# Patient Record
Sex: FEMALE | Race: WHITE | NOT HISPANIC OR LATINO | URBAN - METROPOLITAN AREA
[De-identification: names, ages, dates, MRNs, and addresses within clinical notes are randomized per-mention and may not be internally consistent; named-entity substitution may affect disease eponyms.]

---

## 2021-12-10 ENCOUNTER — APPOINTMENT (RX ONLY)
Dept: URBAN - METROPOLITAN AREA CLINIC 146 | Facility: CLINIC | Age: 76
Setting detail: DERMATOLOGY
End: 2021-12-10

## 2021-12-10 DIAGNOSIS — D485 NEOPLASM OF UNCERTAIN BEHAVIOR OF SKIN: ICD-10-CM

## 2021-12-10 DIAGNOSIS — D69.2 OTHER NONTHROMBOCYTOPENIC PURPURA: ICD-10-CM

## 2021-12-10 PROBLEM — D48.5 NEOPLASM OF UNCERTAIN BEHAVIOR OF SKIN: Status: ACTIVE | Noted: 2021-12-10

## 2021-12-10 PROCEDURE — ? ADDITIONAL NOTES

## 2021-12-10 PROCEDURE — ? FULL BODY SKIN EXAM - DECLINED

## 2021-12-10 PROCEDURE — 11103 TANGNTL BX SKIN EA SEP/ADDL: CPT

## 2021-12-10 PROCEDURE — 11102 TANGNTL BX SKIN SINGLE LES: CPT

## 2021-12-10 PROCEDURE — 99202 OFFICE O/P NEW SF 15 MIN: CPT | Mod: 25

## 2021-12-10 PROCEDURE — ? COUNSELING

## 2021-12-10 PROCEDURE — ? BIOPSY BY SHAVE METHOD

## 2021-12-10 ASSESSMENT — LOCATION ZONE DERM
LOCATION ZONE: FACE
LOCATION ZONE: LEG
LOCATION ZONE: ARM

## 2021-12-10 ASSESSMENT — LOCATION SIMPLE DESCRIPTION DERM
LOCATION SIMPLE: RIGHT ELBOW
LOCATION SIMPLE: LEFT FOREARM
LOCATION SIMPLE: RIGHT PRETIBIAL REGION
LOCATION SIMPLE: LEFT FOREHEAD
LOCATION SIMPLE: LEFT PRETIBIAL REGION

## 2021-12-10 ASSESSMENT — LOCATION DETAILED DESCRIPTION DERM
LOCATION DETAILED: LEFT FOREHEAD
LOCATION DETAILED: RIGHT ELBOW
LOCATION DETAILED: LEFT PROXIMAL PRETIBIAL REGION
LOCATION DETAILED: RIGHT PROXIMAL PRETIBIAL REGION
LOCATION DETAILED: LEFT LATERAL FOREHEAD
LOCATION DETAILED: LEFT PROXIMAL DORSAL FOREARM

## 2022-01-04 ENCOUNTER — APPOINTMENT (RX ONLY)
Dept: URBAN - METROPOLITAN AREA CLINIC 146 | Facility: CLINIC | Age: 77
Setting detail: DERMATOLOGY
End: 2022-01-04

## 2022-01-04 PROBLEM — C44.319 BASAL CELL CARCINOMA OF SKIN OF OTHER PARTS OF FACE: Status: ACTIVE | Noted: 2022-01-04

## 2022-01-04 PROCEDURE — ? FULL BODY SKIN EXAM - DECLINED

## 2022-01-04 PROCEDURE — ? COUNSELING

## 2022-01-04 PROCEDURE — 99213 OFFICE O/P EST LOW 20 MIN: CPT

## 2022-01-04 PROCEDURE — ? TREATMENT REGIMEN

## 2022-01-04 PROCEDURE — ? ADDITIONAL NOTES

## 2022-01-27 ENCOUNTER — APPOINTMENT (RX ONLY)
Dept: URBAN - METROPOLITAN AREA CLINIC 146 | Facility: CLINIC | Age: 77
Setting detail: DERMATOLOGY
End: 2022-01-27

## 2022-01-27 PROBLEM — C44.91 BASAL CELL CARCINOMA OF SKIN, UNSPECIFIED: Status: ACTIVE | Noted: 2022-01-27

## 2022-01-27 PROCEDURE — ? ADDITIONAL NOTES

## 2022-01-27 PROCEDURE — 99212 OFFICE O/P EST SF 10 MIN: CPT

## 2022-01-27 PROCEDURE — ? COUNSELING

## 2023-09-19 NOTE — PROCEDURE: MIPS QUALITY
Quality 402: Tobacco Use And Help With Quitting Among Adolescents: Tobacco Screening OR Tobacco Cessation Intervention not Performed Reason Not Otherwise Specified
Detail Level: Detailed
[place] Counseling:  I discussed with the patient the risks of imiquimod including but not limited to erythema, scaling, itching, weeping, crusting, and pain.  Patient understands that the inflammatory response to imiquimod is variable from person to person and was educated regarded proper titration schedule.  If flu-like symptoms develop, patient knows to discontinue the medication and contact us.

## 2024-08-24 ENCOUNTER — APPOINTMENT (OUTPATIENT)
Facility: HOSPITAL | Age: 79
End: 2024-08-24
Payer: MEDICARE

## 2024-08-24 ENCOUNTER — HOSPITAL ENCOUNTER (INPATIENT)
Facility: HOSPITAL | Age: 79
LOS: 6 days | Discharge: ANOTHER HOSPITAL | End: 2024-08-30
Attending: EMERGENCY MEDICINE | Admitting: STUDENT IN AN ORGANIZED HEALTH CARE EDUCATION/TRAINING PROGRAM
Payer: MEDICARE

## 2024-08-24 DIAGNOSIS — I42.9 CARDIOMYOPATHY, UNSPECIFIED TYPE (HCC): ICD-10-CM

## 2024-08-24 DIAGNOSIS — A41.9 SEPTIC SHOCK (HCC): Primary | ICD-10-CM

## 2024-08-24 DIAGNOSIS — G89.4 CHRONIC PAIN SYNDROME: ICD-10-CM

## 2024-08-24 DIAGNOSIS — R65.21 SEPTIC SHOCK (HCC): Primary | ICD-10-CM

## 2024-08-24 PROBLEM — I95.9 HYPOTENSION: Status: ACTIVE | Noted: 2024-08-24

## 2024-08-24 LAB
ALBUMIN SERPL-MCNC: 3.4 G/DL (ref 3.5–5)
ALBUMIN/GLOB SERPL: 1.3 (ref 1.1–2.2)
ALP SERPL-CCNC: 112 U/L (ref 45–117)
ALT SERPL-CCNC: 23 U/L (ref 12–78)
ANION GAP SERPL CALC-SCNC: 10 MMOL/L (ref 5–15)
AST SERPL-CCNC: 16 U/L (ref 15–37)
BASOPHILS # BLD: 0 K/UL (ref 0–0.1)
BASOPHILS NFR BLD: 0 % (ref 0–1)
BILIRUB SERPL-MCNC: 0.6 MG/DL (ref 0.2–1)
BUN SERPL-MCNC: 40 MG/DL (ref 6–20)
BUN/CREAT SERPL: 13 (ref 12–20)
CALCIUM SERPL-MCNC: 9.3 MG/DL (ref 8.5–10.1)
CHLORIDE SERPL-SCNC: 103 MMOL/L (ref 97–108)
CO2 SERPL-SCNC: 24 MMOL/L (ref 21–32)
CREAT SERPL-MCNC: 3.17 MG/DL (ref 0.55–1.02)
DIFFERENTIAL METHOD BLD: ABNORMAL
EOSINOPHIL # BLD: 0 K/UL (ref 0–0.4)
EOSINOPHIL NFR BLD: 0 % (ref 0–7)
ERYTHROCYTE [DISTWIDTH] IN BLOOD BY AUTOMATED COUNT: 14.8 % (ref 11.5–14.5)
GLOBULIN SER CALC-MCNC: 2.7 G/DL (ref 2–4)
GLUCOSE BLD STRIP.AUTO-MCNC: 105 MG/DL (ref 65–117)
GLUCOSE SERPL-MCNC: 159 MG/DL (ref 65–100)
HCT VFR BLD AUTO: 33.3 % (ref 35–47)
HGB BLD-MCNC: 10.6 G/DL (ref 11.5–16)
IMM GRANULOCYTES # BLD AUTO: 0 K/UL (ref 0–0.04)
IMM GRANULOCYTES NFR BLD AUTO: 0 % (ref 0–0.5)
INR PPP: 5.6 (ref 0.9–1.1)
LACTATE BLD-SCNC: 1.09 MMOL/L (ref 0.4–2)
LACTATE BLD-SCNC: 2.14 MMOL/L (ref 0.4–2)
LYMPHOCYTES # BLD: 1.6 K/UL (ref 0.8–3.5)
LYMPHOCYTES NFR BLD: 15 % (ref 12–49)
MCH RBC QN AUTO: 28.6 PG (ref 26–34)
MCHC RBC AUTO-ENTMCNC: 31.8 G/DL (ref 30–36.5)
MCV RBC AUTO: 90 FL (ref 80–99)
MONOCYTES # BLD: 0.4 K/UL (ref 0–1)
MONOCYTES NFR BLD: 4 % (ref 5–13)
NEUTS SEG # BLD: 8.3 K/UL (ref 1.8–8)
NEUTS SEG NFR BLD: 81 % (ref 32–75)
NRBC # BLD: 0 K/UL (ref 0–0.01)
NRBC BLD-RTO: 0 PER 100 WBC
PLATELET # BLD AUTO: 163 K/UL (ref 150–400)
PMV BLD AUTO: 11.8 FL (ref 8.9–12.9)
POTASSIUM SERPL-SCNC: 4.4 MMOL/L (ref 3.5–5.1)
PROCALCITONIN SERPL-MCNC: <0.05 NG/ML
PROT SERPL-MCNC: 6.1 G/DL (ref 6.4–8.2)
PROTHROMBIN TIME: 52.3 SEC (ref 9–11.1)
RBC # BLD AUTO: 3.7 M/UL (ref 3.8–5.2)
SERVICE CMNT-IMP: NORMAL
SODIUM SERPL-SCNC: 137 MMOL/L (ref 136–145)
TROPONIN I SERPL HS-MCNC: 14 NG/L (ref 0–51)
WBC # BLD AUTO: 10.4 K/UL (ref 3.6–11)

## 2024-08-24 PROCEDURE — 6370000000 HC RX 637 (ALT 250 FOR IP): Performed by: EMERGENCY MEDICINE

## 2024-08-24 PROCEDURE — 86901 BLOOD TYPING SEROLOGIC RH(D): CPT

## 2024-08-24 PROCEDURE — 73700 CT LOWER EXTREMITY W/O DYE: CPT

## 2024-08-24 PROCEDURE — 99285 EMERGENCY DEPT VISIT HI MDM: CPT

## 2024-08-24 PROCEDURE — 36415 COLL VENOUS BLD VENIPUNCTURE: CPT

## 2024-08-24 PROCEDURE — 87040 BLOOD CULTURE FOR BACTERIA: CPT

## 2024-08-24 PROCEDURE — 82962 GLUCOSE BLOOD TEST: CPT

## 2024-08-24 PROCEDURE — 86850 RBC ANTIBODY SCREEN: CPT

## 2024-08-24 PROCEDURE — 3E033XZ INTRODUCTION OF VASOPRESSOR INTO PERIPHERAL VEIN, PERCUTANEOUS APPROACH: ICD-10-PCS | Performed by: INTERNAL MEDICINE

## 2024-08-24 PROCEDURE — 84145 PROCALCITONIN (PCT): CPT

## 2024-08-24 PROCEDURE — 80053 COMPREHEN METABOLIC PANEL: CPT

## 2024-08-24 PROCEDURE — 1100000000 HC RM PRIVATE

## 2024-08-24 PROCEDURE — 2580000003 HC RX 258: Performed by: EMERGENCY MEDICINE

## 2024-08-24 PROCEDURE — 96365 THER/PROPH/DIAG IV INF INIT: CPT

## 2024-08-24 PROCEDURE — 6360000002 HC RX W HCPCS: Performed by: STUDENT IN AN ORGANIZED HEALTH CARE EDUCATION/TRAINING PROGRAM

## 2024-08-24 PROCEDURE — 85610 PROTHROMBIN TIME: CPT

## 2024-08-24 PROCEDURE — 96374 THER/PROPH/DIAG INJ IV PUSH: CPT

## 2024-08-24 PROCEDURE — 6360000002 HC RX W HCPCS: Performed by: EMERGENCY MEDICINE

## 2024-08-24 PROCEDURE — 71045 X-RAY EXAM CHEST 1 VIEW: CPT

## 2024-08-24 PROCEDURE — 86923 COMPATIBILITY TEST ELECTRIC: CPT

## 2024-08-24 PROCEDURE — 93005 ELECTROCARDIOGRAM TRACING: CPT | Performed by: EMERGENCY MEDICINE

## 2024-08-24 PROCEDURE — 84484 ASSAY OF TROPONIN QUANT: CPT

## 2024-08-24 PROCEDURE — 85025 COMPLETE CBC W/AUTO DIFF WBC: CPT

## 2024-08-24 PROCEDURE — 6370000000 HC RX 637 (ALT 250 FOR IP): Performed by: STUDENT IN AN ORGANIZED HEALTH CARE EDUCATION/TRAINING PROGRAM

## 2024-08-24 PROCEDURE — 2580000003 HC RX 258: Performed by: STUDENT IN AN ORGANIZED HEALTH CARE EDUCATION/TRAINING PROGRAM

## 2024-08-24 PROCEDURE — 86900 BLOOD TYPING SEROLOGIC ABO: CPT

## 2024-08-24 PROCEDURE — 83605 ASSAY OF LACTIC ACID: CPT

## 2024-08-24 PROCEDURE — 96361 HYDRATE IV INFUSION ADD-ON: CPT

## 2024-08-24 RX ORDER — ATORVASTATIN CALCIUM 40 MG/1
40 TABLET, FILM COATED ORAL EVERY EVENING
Status: ON HOLD | COMMUNITY
End: 2024-08-27 | Stop reason: HOSPADM

## 2024-08-24 RX ORDER — ONDANSETRON 2 MG/ML
4 INJECTION INTRAMUSCULAR; INTRAVENOUS EVERY 6 HOURS PRN
Status: DISCONTINUED | OUTPATIENT
Start: 2024-08-24 | End: 2024-08-25

## 2024-08-24 RX ORDER — 0.9 % SODIUM CHLORIDE 0.9 %
1000 INTRAVENOUS SOLUTION INTRAVENOUS ONCE
Status: COMPLETED | OUTPATIENT
Start: 2024-08-24 | End: 2024-08-24

## 2024-08-24 RX ORDER — OXYCODONE HYDROCHLORIDE 10 MG/1
10 TABLET ORAL EVERY 4 HOURS PRN
Status: DISCONTINUED | OUTPATIENT
Start: 2024-08-24 | End: 2024-08-25

## 2024-08-24 RX ORDER — ACETAMINOPHEN 325 MG/1
650 TABLET ORAL EVERY 6 HOURS PRN
Status: DISCONTINUED | OUTPATIENT
Start: 2024-08-24 | End: 2024-08-30 | Stop reason: HOSPADM

## 2024-08-24 RX ORDER — POLYETHYLENE GLYCOL 3350 17 G/17G
17 POWDER, FOR SOLUTION ORAL DAILY PRN
Status: DISCONTINUED | OUTPATIENT
Start: 2024-08-24 | End: 2024-08-25

## 2024-08-24 RX ORDER — SODIUM CHLORIDE 0.9 % (FLUSH) 0.9 %
5-40 SYRINGE (ML) INJECTION EVERY 12 HOURS SCHEDULED
Status: DISCONTINUED | OUTPATIENT
Start: 2024-08-24 | End: 2024-08-30 | Stop reason: HOSPADM

## 2024-08-24 RX ORDER — SODIUM CHLORIDE 9 MG/ML
INJECTION, SOLUTION INTRAVENOUS CONTINUOUS
Status: DISCONTINUED | OUTPATIENT
Start: 2024-08-24 | End: 2024-08-25

## 2024-08-24 RX ORDER — TRAMADOL HYDROCHLORIDE 50 MG/1
50 TABLET ORAL 2 TIMES DAILY PRN
Status: ON HOLD | COMMUNITY
End: 2024-08-27

## 2024-08-24 RX ORDER — ACETAMINOPHEN 650 MG/1
650 SUPPOSITORY RECTAL EVERY 6 HOURS PRN
Status: DISCONTINUED | OUTPATIENT
Start: 2024-08-24 | End: 2024-08-25

## 2024-08-24 RX ORDER — MIDODRINE HYDROCHLORIDE 5 MG/1
10 TABLET ORAL ONCE
Status: DISCONTINUED | OUTPATIENT
Start: 2024-08-24 | End: 2024-08-24

## 2024-08-24 RX ORDER — FUROSEMIDE 20 MG
20 TABLET ORAL DAILY PRN
Status: ON HOLD | COMMUNITY
End: 2024-08-27 | Stop reason: HOSPADM

## 2024-08-24 RX ORDER — LORATADINE 10 MG/1
10 TABLET ORAL DAILY
COMMUNITY

## 2024-08-24 RX ORDER — ONDANSETRON 4 MG/1
4 TABLET, ORALLY DISINTEGRATING ORAL EVERY 8 HOURS PRN
Status: DISCONTINUED | OUTPATIENT
Start: 2024-08-24 | End: 2024-08-30 | Stop reason: HOSPADM

## 2024-08-24 RX ORDER — HYDROCODONE BITARTRATE AND ACETAMINOPHEN 5; 325 MG/1; MG/1
1 TABLET ORAL
Status: COMPLETED | OUTPATIENT
Start: 2024-08-24 | End: 2024-08-24

## 2024-08-24 RX ORDER — MIDODRINE HYDROCHLORIDE 5 MG/1
5 TABLET ORAL
Status: COMPLETED | OUTPATIENT
Start: 2024-08-24 | End: 2024-08-24

## 2024-08-24 RX ORDER — GABAPENTIN 100 MG/1
100 CAPSULE ORAL 3 TIMES DAILY
Status: ON HOLD | COMMUNITY
End: 2024-08-27

## 2024-08-24 RX ORDER — ALBUMIN, HUMAN INJ 5% 5 %
25 SOLUTION INTRAVENOUS ONCE
Status: COMPLETED | OUTPATIENT
Start: 2024-08-24 | End: 2024-08-25

## 2024-08-24 RX ORDER — SODIUM CHLORIDE 0.9 % (FLUSH) 0.9 %
5-40 SYRINGE (ML) INJECTION PRN
Status: DISCONTINUED | OUTPATIENT
Start: 2024-08-24 | End: 2024-08-30 | Stop reason: HOSPADM

## 2024-08-24 RX ORDER — WARFARIN SODIUM 2 MG/1
2 TABLET ORAL
COMMUNITY

## 2024-08-24 RX ORDER — CARVEDILOL 6.25 MG/1
6.25 TABLET ORAL 2 TIMES DAILY WITH MEALS
Status: ON HOLD | COMMUNITY
End: 2024-08-29 | Stop reason: HOSPADM

## 2024-08-24 RX ORDER — LISINOPRIL 5 MG/1
5 TABLET ORAL DAILY
Status: ON HOLD | COMMUNITY
End: 2024-08-27 | Stop reason: HOSPADM

## 2024-08-24 RX ORDER — ATORVASTATIN CALCIUM 20 MG/1
40 TABLET, FILM COATED ORAL EVERY EVENING
Status: DISCONTINUED | OUTPATIENT
Start: 2024-08-24 | End: 2024-08-27

## 2024-08-24 RX ORDER — SODIUM CHLORIDE 9 MG/ML
INJECTION, SOLUTION INTRAVENOUS PRN
Status: DISCONTINUED | OUTPATIENT
Start: 2024-08-24 | End: 2024-08-30 | Stop reason: HOSPADM

## 2024-08-24 RX ORDER — GABAPENTIN 100 MG/1
100 CAPSULE ORAL 3 TIMES DAILY
Status: DISCONTINUED | OUTPATIENT
Start: 2024-08-25 | End: 2024-08-30 | Stop reason: HOSPADM

## 2024-08-24 RX ORDER — OXYCODONE HYDROCHLORIDE 5 MG/1
5 TABLET ORAL EVERY 4 HOURS PRN
Status: DISCONTINUED | OUTPATIENT
Start: 2024-08-24 | End: 2024-08-30 | Stop reason: HOSPADM

## 2024-08-24 RX ADMIN — WATER 2000 MG: 1 INJECTION INTRAMUSCULAR; INTRAVENOUS; SUBCUTANEOUS at 20:06

## 2024-08-24 RX ADMIN — PHYTONADIONE 2.5 MG: 10 INJECTION, EMULSION INTRAMUSCULAR; INTRAVENOUS; SUBCUTANEOUS at 23:34

## 2024-08-24 RX ADMIN — ATORVASTATIN CALCIUM 40 MG: 20 TABLET, FILM COATED ORAL at 23:34

## 2024-08-24 RX ADMIN — MIDODRINE HYDROCHLORIDE 5 MG: 5 TABLET ORAL at 20:04

## 2024-08-24 RX ADMIN — SODIUM CHLORIDE: 9 INJECTION, SOLUTION INTRAVENOUS at 23:33

## 2024-08-24 RX ADMIN — VANCOMYCIN HYDROCHLORIDE 2000 MG: 10 INJECTION, POWDER, LYOPHILIZED, FOR SOLUTION INTRAVENOUS at 20:16

## 2024-08-24 RX ADMIN — HYDROCODONE BITARTRATE AND ACETAMINOPHEN 1 TABLET: 5; 325 TABLET ORAL at 21:21

## 2024-08-24 RX ADMIN — SODIUM CHLORIDE, PRESERVATIVE FREE 10 ML: 5 INJECTION INTRAVENOUS at 23:36

## 2024-08-24 RX ADMIN — SODIUM CHLORIDE 1000 ML: 9 INJECTION, SOLUTION INTRAVENOUS at 19:10

## 2024-08-24 RX ADMIN — SODIUM CHLORIDE 1000 ML: 9 INJECTION, SOLUTION INTRAVENOUS at 18:31

## 2024-08-24 ASSESSMENT — LIFESTYLE VARIABLES
HOW OFTEN DO YOU HAVE A DRINK CONTAINING ALCOHOL: NEVER
HOW MANY STANDARD DRINKS CONTAINING ALCOHOL DO YOU HAVE ON A TYPICAL DAY: PATIENT DOES NOT DRINK

## 2024-08-24 ASSESSMENT — PAIN SCALES - GENERAL: PAINLEVEL_OUTOF10: 3

## 2024-08-24 ASSESSMENT — PAIN - FUNCTIONAL ASSESSMENT: PAIN_FUNCTIONAL_ASSESSMENT: 0-10

## 2024-08-24 ASSESSMENT — PAIN DESCRIPTION - ORIENTATION: ORIENTATION: LEFT

## 2024-08-24 ASSESSMENT — PAIN DESCRIPTION - LOCATION: LOCATION: LEG

## 2024-08-24 NOTE — ED TRIAGE NOTES
Patient arrived in w/c via POV. Patient reports seen yesterday at [facility] ED and now here for \"follow up.\"     Patient reports a dog jumped on her left leg and then developed a hematoma. Reports ED attempted to drain it yesterday and it \"split like a watermelon.\"     Family reports now the area on her leg is concerning and wants evaluated. Denies fever/chills. Reports nausea/vomiting since ED yesterday - unable to tolerate any po intake     Reports she took norco around 3pm with good pain relief.

## 2024-08-24 NOTE — ED PROVIDER NOTES
date/time: 8PM      Pt presents with septic shock of unknown etiology.  Received abx and IV fluids with improvement in vital signs.  Admitted to the hospital for additional management.  DDX:  PNA, pericarditis, electrolyte abnormality, dehydration, influenza, UTI, pyelonephritis, gastroenteritis, diverticulitis, cholecystitis    REASSESSMENT     ED Course as of 08/24/24 2002   Sat Aug 24, 2024   2000 INR(!!): 5.6  Patient is not currently bleeding so vitamin K and FFP are not indicated at this time.  We will continue to hold her Coumadin. [IO]      ED Course User Index  [IO] Inna Higgins MD           CONSULTS:  None    PROCEDURES:  Unless otherwise noted below, none     Procedures      FINAL IMPRESSION      1. Septic shock (HCC)          DISPOSITION/PLAN   DISPOSITION Decision To Admit 08/24/2024 08:00:27 PM      Perfect Serve Consult for Admission  8:02 PM    ED Room Number: ER01/01  Patient Name and age:  Olimpia Hernandez 79 y.o.  female  Working Diagnosis:   1. Septic shock (HCC)      COVID-19 Suspicion: No  Sepsis present:  Yes  Reassessment needed: No  Code Status:  Full Code  Readmission: No  Isolation Requirements: no  Recommended Level of Care: step down  Department: [place] ED - [phone]  Consulting Provider: Dr. Rock  Other:  maintaining BP after fluids, abx and midodrine ordered, no clear source right now      Inna Higgins MD has spent 45 minutes of critical care time involved in lab review, consultations with specialist, family decision-making, and documentation.  During this entire length of time I was immediately available to the patient.    Critical Care:  The reason for providing this level of medical care for this critically ill patient was due a critical illness that impaired one or more vital organ systems such that there was a high probability of imminent or life threatening deterioration in the patients condition. This care involved high complexity decision making to assess,  manipulate, and support vital system functions, to treat this degreee vital organ system failure and to prevent further life threatening deterioration of the patient’s condition.        (Please note that portions of this note were completed with a voice recognition program.  Efforts were made to edit the dictations but occasionally words are mis-transcribed.)    Inna Higgins MD (electronically signed)  Emergency Attending Physician / Physician Assistant / Nurse Practitioner              Inna Higgins MD  08/24/24 2004

## 2024-08-24 NOTE — ED NOTES
Blood pressure soft at this time. MD made aware. Will continue to administer fluids and monitor BP

## 2024-08-25 ENCOUNTER — APPOINTMENT (OUTPATIENT)
Facility: HOSPITAL | Age: 79
End: 2024-08-25
Payer: MEDICARE

## 2024-08-25 PROBLEM — R57.1 HYPOVOLEMIC SHOCK (HCC): Status: ACTIVE | Noted: 2024-08-25

## 2024-08-25 PROBLEM — D64.9 ANEMIA: Status: ACTIVE | Noted: 2024-08-25

## 2024-08-25 PROBLEM — T45.511A OVERDOSE OF COUMADIN: Status: ACTIVE | Noted: 2024-08-25

## 2024-08-25 PROBLEM — T14.8XXA HEMATOMA: Status: ACTIVE | Noted: 2024-08-25

## 2024-08-25 PROBLEM — E88.09 HYPOALBUMINEMIA: Status: ACTIVE | Noted: 2024-08-25

## 2024-08-25 PROBLEM — E86.0 DEHYDRATION: Status: ACTIVE | Noted: 2024-08-25

## 2024-08-25 PROBLEM — N17.9 AKI (ACUTE KIDNEY INJURY) (HCC): Status: ACTIVE | Noted: 2024-08-25

## 2024-08-25 LAB
ALBUMIN SERPL-MCNC: 3 G/DL (ref 3.5–5)
ALBUMIN/GLOB SERPL: 1.5 (ref 1.1–2.2)
ALP SERPL-CCNC: 83 U/L (ref 45–117)
ALT SERPL-CCNC: 20 U/L (ref 12–78)
ANION GAP SERPL CALC-SCNC: 5 MMOL/L (ref 5–15)
ANION GAP SERPL CALC-SCNC: 6 MMOL/L (ref 5–15)
APPEARANCE UR: ABNORMAL
AST SERPL-CCNC: 37 U/L (ref 15–37)
BACTERIA URNS QL MICRO: NEGATIVE /HPF
BASOPHILS # BLD: 0 K/UL (ref 0–0.1)
BASOPHILS # BLD: 0 K/UL (ref 0–0.1)
BASOPHILS NFR BLD: 0 % (ref 0–1)
BASOPHILS NFR BLD: 0 % (ref 0–1)
BILIRUB SERPL-MCNC: 0.9 MG/DL (ref 0.2–1)
BILIRUB UR QL: NEGATIVE
BUN SERPL-MCNC: 34 MG/DL (ref 6–20)
BUN SERPL-MCNC: 36 MG/DL (ref 6–20)
BUN/CREAT SERPL: 16 (ref 12–20)
BUN/CREAT SERPL: 16 (ref 12–20)
CALCIUM SERPL-MCNC: 7.9 MG/DL (ref 8.5–10.1)
CALCIUM SERPL-MCNC: 7.9 MG/DL (ref 8.5–10.1)
CHLORIDE SERPL-SCNC: 113 MMOL/L (ref 97–108)
CHLORIDE SERPL-SCNC: 113 MMOL/L (ref 97–108)
CHOLEST SERPL-MCNC: 93 MG/DL
CO2 SERPL-SCNC: 23 MMOL/L (ref 21–32)
CO2 SERPL-SCNC: 24 MMOL/L (ref 21–32)
COLOR UR: ABNORMAL
CREAT SERPL-MCNC: 2.15 MG/DL (ref 0.55–1.02)
CREAT SERPL-MCNC: 2.28 MG/DL (ref 0.55–1.02)
CREAT UR-MCNC: 177 MG/DL
DIFFERENTIAL METHOD BLD: ABNORMAL
DIFFERENTIAL METHOD BLD: ABNORMAL
EKG DIAGNOSIS: NORMAL
EKG Q-T INTERVAL: 348 MS
EKG QRS DURATION: 70 MS
EKG QTC CALCULATION (BAZETT): 491 MS
EKG R AXIS: 116 DEGREES
EKG T AXIS: -42 DEGREES
EKG VENTRICULAR RATE: 120 BPM
EOSINOPHIL # BLD: 0.1 K/UL (ref 0–0.4)
EOSINOPHIL # BLD: 0.2 K/UL (ref 0–0.4)
EOSINOPHIL #/AREA URNS HPF: NEGATIVE
EOSINOPHIL NFR BLD: 1 % (ref 0–7)
EOSINOPHIL NFR BLD: 2 % (ref 0–7)
EPITH CASTS URNS QL MICRO: ABNORMAL /LPF
ERYTHROCYTE [DISTWIDTH] IN BLOOD BY AUTOMATED COUNT: 14.9 % (ref 11.5–14.5)
ERYTHROCYTE [DISTWIDTH] IN BLOOD BY AUTOMATED COUNT: 15 % (ref 11.5–14.5)
FERRITIN SERPL-MCNC: 88 NG/ML (ref 26–388)
FLUAV RNA SPEC QL NAA+PROBE: NOT DETECTED
FLUBV RNA SPEC QL NAA+PROBE: NOT DETECTED
FOLATE SERPL-MCNC: 13 NG/ML (ref 5–21)
GLOBULIN SER CALC-MCNC: 2 G/DL (ref 2–4)
GLUCOSE SERPL-MCNC: 85 MG/DL (ref 65–100)
GLUCOSE SERPL-MCNC: 88 MG/DL (ref 65–100)
GLUCOSE UR STRIP.AUTO-MCNC: NEGATIVE MG/DL
HAPTOGLOB SERPL-MCNC: 101 MG/DL (ref 30–200)
HCT VFR BLD AUTO: 20.9 % (ref 35–47)
HCT VFR BLD AUTO: 23.3 % (ref 35–47)
HDLC SERPL-MCNC: 29 MG/DL
HDLC SERPL: 3.2 (ref 0–5)
HGB BLD-MCNC: 6.7 G/DL (ref 11.5–16)
HGB BLD-MCNC: 7.4 G/DL (ref 11.5–16)
HGB UR QL STRIP: NEGATIVE
HISTORY CHECK: NORMAL
IMM GRANULOCYTES # BLD AUTO: 0 K/UL (ref 0–0.04)
IMM GRANULOCYTES # BLD AUTO: 0 K/UL (ref 0–0.04)
IMM GRANULOCYTES NFR BLD AUTO: 0 % (ref 0–0.5)
IMM GRANULOCYTES NFR BLD AUTO: 0 % (ref 0–0.5)
INR PPP: 6.4 (ref 0.9–1.1)
IRON SATN MFR SERPL: 31 % (ref 20–50)
IRON SERPL-MCNC: 53 UG/DL (ref 35–150)
KETONES UR QL STRIP.AUTO: ABNORMAL MG/DL
LDH SERPL L TO P-CCNC: 312 U/L (ref 81–246)
LDLC SERPL CALC-MCNC: 42 MG/DL (ref 0–100)
LEUKOCYTE ESTERASE UR QL STRIP.AUTO: NEGATIVE
LYMPHOCYTES # BLD: 1.2 K/UL (ref 0.8–3.5)
LYMPHOCYTES # BLD: 1.7 K/UL (ref 0.8–3.5)
LYMPHOCYTES NFR BLD: 17 % (ref 12–49)
LYMPHOCYTES NFR BLD: 18 % (ref 12–49)
MAGNESIUM SERPL-MCNC: 2.1 MG/DL (ref 1.6–2.4)
MAGNESIUM SERPL-MCNC: 2.1 MG/DL (ref 1.6–2.4)
MCH RBC QN AUTO: 28.7 PG (ref 26–34)
MCH RBC QN AUTO: 29.1 PG (ref 26–34)
MCHC RBC AUTO-ENTMCNC: 31.8 G/DL (ref 30–36.5)
MCHC RBC AUTO-ENTMCNC: 32.1 G/DL (ref 30–36.5)
MCV RBC AUTO: 90.3 FL (ref 80–99)
MCV RBC AUTO: 90.9 FL (ref 80–99)
MONOCYTES # BLD: 0.5 K/UL (ref 0–1)
MONOCYTES # BLD: 0.7 K/UL (ref 0–1)
MONOCYTES NFR BLD: 7 % (ref 5–13)
MONOCYTES NFR BLD: 8 % (ref 5–13)
NEUTS SEG # BLD: 4.5 K/UL (ref 1.8–8)
NEUTS SEG # BLD: 7.3 K/UL (ref 1.8–8)
NEUTS SEG NFR BLD: 71 % (ref 32–75)
NEUTS SEG NFR BLD: 74 % (ref 32–75)
NITRITE UR QL STRIP.AUTO: NEGATIVE
NRBC # BLD: 0 K/UL (ref 0–0.01)
NRBC # BLD: 0 K/UL (ref 0–0.01)
NRBC BLD-RTO: 0 PER 100 WBC
NRBC BLD-RTO: 0 PER 100 WBC
PH UR STRIP: 5 (ref 5–8)
PHOSPHATE SERPL-MCNC: 3.3 MG/DL (ref 2.6–4.7)
PHOSPHATE SERPL-MCNC: 3.6 MG/DL (ref 2.6–4.7)
PLATELET # BLD AUTO: 103 K/UL (ref 150–400)
PLATELET # BLD AUTO: 121 K/UL (ref 150–400)
PMV BLD AUTO: 11.5 FL (ref 8.9–12.9)
PMV BLD AUTO: 11.6 FL (ref 8.9–12.9)
POTASSIUM SERPL-SCNC: 3.6 MMOL/L (ref 3.5–5.1)
POTASSIUM SERPL-SCNC: 4.7 MMOL/L (ref 3.5–5.1)
PROCALCITONIN SERPL-MCNC: <0.05 NG/ML
PROT SERPL-MCNC: 5 G/DL (ref 6.4–8.2)
PROT UR STRIP-MCNC: ABNORMAL MG/DL
PROT UR-MCNC: 57 MG/DL (ref 0–11.9)
PROTHROMBIN TIME: 59.2 SEC (ref 9–11.1)
RBC # BLD AUTO: 2.3 M/UL (ref 3.8–5.2)
RBC # BLD AUTO: 2.58 M/UL (ref 3.8–5.2)
RBC #/AREA URNS HPF: ABNORMAL /HPF (ref 0–5)
SARS-COV-2 RNA RESP QL NAA+PROBE: NOT DETECTED
SODIUM SERPL-SCNC: 141 MMOL/L (ref 136–145)
SODIUM SERPL-SCNC: 143 MMOL/L (ref 136–145)
SODIUM UR-SCNC: 64 MMOL/L
SOURCE: NORMAL
SP GR UR REFRACTOMETRY: 1.03 (ref 1–1.03)
SPECIMEN HOLD: NORMAL
TIBC SERPL-MCNC: 172 UG/DL (ref 250–450)
TRIGL SERPL-MCNC: 110 MG/DL
TSH SERPL DL<=0.05 MIU/L-ACNC: 4.13 UIU/ML (ref 0.36–3.74)
UROBILINOGEN UR QL STRIP.AUTO: 0.2 EU/DL (ref 0.2–1)
VIT B12 SERPL-MCNC: 228 PG/ML (ref 193–986)
VLDLC SERPL CALC-MCNC: 22 MG/DL
WBC # BLD AUTO: 6.4 K/UL (ref 3.6–11)
WBC # BLD AUTO: 9.9 K/UL (ref 3.6–11)
WBC URNS QL MICRO: ABNORMAL /HPF (ref 0–4)

## 2024-08-25 PROCEDURE — 85610 PROTHROMBIN TIME: CPT

## 2024-08-25 PROCEDURE — 87205 SMEAR GRAM STAIN: CPT

## 2024-08-25 PROCEDURE — 6370000000 HC RX 637 (ALT 250 FOR IP): Performed by: STUDENT IN AN ORGANIZED HEALTH CARE EDUCATION/TRAINING PROGRAM

## 2024-08-25 PROCEDURE — 87086 URINE CULTURE/COLONY COUNT: CPT

## 2024-08-25 PROCEDURE — 83735 ASSAY OF MAGNESIUM: CPT

## 2024-08-25 PROCEDURE — 6360000002 HC RX W HCPCS: Performed by: INTERNAL MEDICINE

## 2024-08-25 PROCEDURE — 81001 URINALYSIS AUTO W/SCOPE: CPT

## 2024-08-25 PROCEDURE — 2060000000 HC ICU INTERMEDIATE R&B

## 2024-08-25 PROCEDURE — 85025 COMPLETE CBC W/AUTO DIFF WBC: CPT

## 2024-08-25 PROCEDURE — 83010 ASSAY OF HAPTOGLOBIN QUANT: CPT

## 2024-08-25 PROCEDURE — 84100 ASSAY OF PHOSPHORUS: CPT

## 2024-08-25 PROCEDURE — 84300 ASSAY OF URINE SODIUM: CPT

## 2024-08-25 PROCEDURE — 80061 LIPID PANEL: CPT

## 2024-08-25 PROCEDURE — 82607 VITAMIN B-12: CPT

## 2024-08-25 PROCEDURE — 36430 TRANSFUSION BLD/BLD COMPNT: CPT

## 2024-08-25 PROCEDURE — 94761 N-INVAS EAR/PLS OXIMETRY MLT: CPT

## 2024-08-25 PROCEDURE — 93010 ELECTROCARDIOGRAM REPORT: CPT | Performed by: INTERNAL MEDICINE

## 2024-08-25 PROCEDURE — 6360000002 HC RX W HCPCS: Performed by: STUDENT IN AN ORGANIZED HEALTH CARE EDUCATION/TRAINING PROGRAM

## 2024-08-25 PROCEDURE — P9045 ALBUMIN (HUMAN), 5%, 250 ML: HCPCS | Performed by: STUDENT IN AN ORGANIZED HEALTH CARE EDUCATION/TRAINING PROGRAM

## 2024-08-25 PROCEDURE — 36415 COLL VENOUS BLD VENIPUNCTURE: CPT

## 2024-08-25 PROCEDURE — 87636 SARSCOV2 & INF A&B AMP PRB: CPT

## 2024-08-25 PROCEDURE — 6370000000 HC RX 637 (ALT 250 FOR IP): Performed by: INTERNAL MEDICINE

## 2024-08-25 PROCEDURE — 82746 ASSAY OF FOLIC ACID SERUM: CPT

## 2024-08-25 PROCEDURE — 83540 ASSAY OF IRON: CPT

## 2024-08-25 PROCEDURE — 82728 ASSAY OF FERRITIN: CPT

## 2024-08-25 PROCEDURE — 83550 IRON BINDING TEST: CPT

## 2024-08-25 PROCEDURE — 2580000003 HC RX 258: Performed by: STUDENT IN AN ORGANIZED HEALTH CARE EDUCATION/TRAINING PROGRAM

## 2024-08-25 PROCEDURE — 84156 ASSAY OF PROTEIN URINE: CPT

## 2024-08-25 PROCEDURE — 30233N1 TRANSFUSION OF NONAUTOLOGOUS RED BLOOD CELLS INTO PERIPHERAL VEIN, PERCUTANEOUS APPROACH: ICD-10-PCS | Performed by: INTERNAL MEDICINE

## 2024-08-25 PROCEDURE — 84443 ASSAY THYROID STIM HORMONE: CPT

## 2024-08-25 PROCEDURE — 80053 COMPREHEN METABOLIC PANEL: CPT

## 2024-08-25 PROCEDURE — 83615 LACTATE (LD) (LDH) ENZYME: CPT

## 2024-08-25 PROCEDURE — 82570 ASSAY OF URINE CREATININE: CPT

## 2024-08-25 PROCEDURE — 76770 US EXAM ABDO BACK WALL COMP: CPT

## 2024-08-25 PROCEDURE — 84145 PROCALCITONIN (PCT): CPT

## 2024-08-25 PROCEDURE — P9016 RBC LEUKOCYTES REDUCED: HCPCS

## 2024-08-25 PROCEDURE — 2580000003 HC RX 258: Performed by: INTERNAL MEDICINE

## 2024-08-25 RX ORDER — TRAMADOL HYDROCHLORIDE 50 MG/1
50 TABLET ORAL EVERY 6 HOURS PRN
Status: DISCONTINUED | OUTPATIENT
Start: 2024-08-25 | End: 2024-08-30 | Stop reason: HOSPADM

## 2024-08-25 RX ORDER — POLYETHYLENE GLYCOL 3350 17 G/17G
17 POWDER, FOR SOLUTION ORAL 2 TIMES DAILY
Status: DISCONTINUED | OUTPATIENT
Start: 2024-08-25 | End: 2024-08-30 | Stop reason: HOSPADM

## 2024-08-25 RX ORDER — CARVEDILOL 6.25 MG/1
6.25 TABLET ORAL 2 TIMES DAILY WITH MEALS
Status: DISCONTINUED | OUTPATIENT
Start: 2024-08-25 | End: 2024-08-28

## 2024-08-25 RX ORDER — SODIUM CHLORIDE 9 MG/ML
INJECTION, SOLUTION INTRAVENOUS PRN
Status: DISCONTINUED | OUTPATIENT
Start: 2024-08-25 | End: 2024-08-28

## 2024-08-25 RX ORDER — SODIUM CHLORIDE 9 MG/ML
INJECTION, SOLUTION INTRAVENOUS CONTINUOUS
Status: DISCONTINUED | OUTPATIENT
Start: 2024-08-25 | End: 2024-08-28

## 2024-08-25 RX ADMIN — ACETAMINOPHEN 650 MG: 325 TABLET ORAL at 16:51

## 2024-08-25 RX ADMIN — GABAPENTIN 100 MG: 100 CAPSULE ORAL at 14:12

## 2024-08-25 RX ADMIN — OXYCODONE 5 MG: 5 TABLET ORAL at 03:55

## 2024-08-25 RX ADMIN — GABAPENTIN 100 MG: 100 CAPSULE ORAL at 09:02

## 2024-08-25 RX ADMIN — SODIUM CHLORIDE, PRESERVATIVE FREE 10 ML: 5 INJECTION INTRAVENOUS at 09:28

## 2024-08-25 RX ADMIN — SODIUM CHLORIDE, PRESERVATIVE FREE 10 ML: 5 INJECTION INTRAVENOUS at 20:44

## 2024-08-25 RX ADMIN — PHYTONADIONE 10 MG: 10 INJECTION, EMULSION INTRAMUSCULAR; INTRAVENOUS; SUBCUTANEOUS at 10:53

## 2024-08-25 RX ADMIN — GABAPENTIN 100 MG: 100 CAPSULE ORAL at 20:45

## 2024-08-25 RX ADMIN — ATORVASTATIN CALCIUM 40 MG: 20 TABLET, FILM COATED ORAL at 16:51

## 2024-08-25 RX ADMIN — PHENYLEPHRINE HYDROCHLORIDE 30 MCG/MIN: 10 INJECTION INTRAVENOUS at 01:24

## 2024-08-25 RX ADMIN — OXYCODONE 5 MG: 5 TABLET ORAL at 09:18

## 2024-08-25 RX ADMIN — SODIUM CHLORIDE: 9 INJECTION, SOLUTION INTRAVENOUS at 07:50

## 2024-08-25 RX ADMIN — ALBUMIN (HUMAN) 25 G: 12.5 INJECTION, SOLUTION INTRAVENOUS at 00:04

## 2024-08-25 RX ADMIN — POLYETHYLENE GLYCOL 3350 17 G: 17 POWDER, FOR SOLUTION ORAL at 20:45

## 2024-08-25 RX ADMIN — SODIUM CHLORIDE: 9 INJECTION, SOLUTION INTRAVENOUS at 17:10

## 2024-08-25 ASSESSMENT — PAIN SCALES - GENERAL
PAINLEVEL_OUTOF10: 4
PAINLEVEL_OUTOF10: 3
PAINLEVEL_OUTOF10: 6
PAINLEVEL_OUTOF10: 6

## 2024-08-25 ASSESSMENT — PAIN DESCRIPTION - ORIENTATION
ORIENTATION: RIGHT
ORIENTATION: LEFT
ORIENTATION: RIGHT

## 2024-08-25 ASSESSMENT — PAIN DESCRIPTION - LOCATION
LOCATION: LEG

## 2024-08-25 NOTE — ED NOTES
TRANSFER - OUT REPORT:    Verbal report given to Kayode MOY on Olimpia Hernandez  being transferred to [facility] for routine progression of patient care       Report consisted of patient's Situation, Background, Assessment and   Recommendations(SBAR).     Information from the following report(s) Nurse Handoff Report, ED Encounter Summary, ED SBAR, Surgery Report, MAR, and Recent Results was reviewed with the receiving nurse.    [place] Fall Assessment:    Presents to emergency department  because of falls (Syncope, seizure, or loss of consciousness): No  Age > 70: Yes  Altered Mental Status, Intoxication with alcohol or substance confusion (Disorientation, impaired judgment, poor safety awaremess, or inability to follow instructions): No  Impaired Mobility: Ambulates or transfers with assistive devices or assistance; Unable to ambulate or transer.: Yes  Nursing Judgement: Yes          Lines:   Peripheral IV 08/24/24 Right Antecubital (Active)   Site Assessment Clean, dry & intact 08/24/24 1829   Line Status Blood return noted;Specimen collected;Normal saline locked 08/24/24 1829   Line Care Cap changed 08/24/24 1829   Phlebitis Assessment No symptoms 08/24/24 1829   Infiltration Assessment 0 08/24/24 1829   Alcohol Cap Used Yes 08/24/24 1829   Dressing Status New dressing applied 08/24/24 1829   Dressing Type Transparent 08/24/24 1829   Dressing Intervention New 08/24/24 1829       Peripheral IV 08/24/24 Left Antecubital (Active)        Opportunity for questions and clarification was provided.      Patient transported with:  Registered Nurse

## 2024-08-25 NOTE — PROGRESS NOTES
[facility] Dosing Services: 08/25/24   Consult for Warfarin Dosing by Pharmacy by Dr. John  Consult provided for this 79 y.o. female , for indication of Afib/flutter  Day of Therapy: continued from home  According to the patient, 3 mg on MWF,  2 mg on TT, no warfarin on weekends    Dose to achieve an INR goal of 2-3    A/Plan:  No Warfarin ordered to be administered tonight 8/26  Vit K 2.5 mg PO given 8/24 @ 2334 ; with a second dose ordered for 8/25 @ 1100 (not yet given  Current INR 6.4    Significant drug interactions, such as enoxaparin: n/a  PT/INR Lab Results   Component Value Date/Time    INR 6.4 08/25/2024 06:41 AM      Platelets Lab Results   Component Value Date/Time     08/25/2024 06:41 AM      H/H Lab Results   Component Value Date/Time    HGB 7.4 08/25/2024 06:41 AM        Pharmacy to follow daily and will provide subsequent Warfarin dosing based on clinical status.  Naresh Mckeon [facility]) Contact information [phone]

## 2024-08-25 NOTE — PLAN OF CARE
Problem: Discharge Planning  Goal: Discharge to home or other facility with appropriate resources  Outcome: Progressing  Flowsheets (Taken 8/25/2024 3260 by Kayode Rock, RN)  Discharge to home or other facility with appropriate resources:   Identify barriers to discharge with patient and caregiver   Arrange for needed discharge resources and transportation as appropriate   Identify discharge learning needs (meds, wound care, etc)   Arrange for interpreters to assist at discharge as needed   Refer to discharge planning if patient needs post-hospital services based on physician order or complex needs related to functional status, cognitive ability or social support system     Problem: Safety - Adult  Goal: Free from fall injury  Outcome: Progressing     Problem: ABCDS Injury Assessment  Goal: Absence of physical injury  Outcome: Progressing     Problem: Pain  Goal: Verbalizes/displays adequate comfort level or baseline comfort level  Outcome: Progressing     Problem: Skin/Tissue Integrity  Goal: Absence of new skin breakdown  Description: 1.  Monitor for areas of redness and/or skin breakdown  2.  Assess vascular access sites hourly  3.  Every 4-6 hours minimum:  Change oxygen saturation probe site  4.  Every 4-6 hours:  If on nasal continuous positive airway pressure, respiratory therapy assess nares and determine need for appliance change or resting period.  Outcome: Progressing

## 2024-08-25 NOTE — PROGRESS NOTES
[facility]    Hospitalist Progress Note    NAME: Olimpia Hernandez   :  1945  MRM:  [id number]    Date/Time of service 2024  7:06 AM    To assist coordination of care and communication with nursing and staff, this note may be preliminary early in the day, but finalized by end of the day.        Assessment and Plan:     Hypovolemic shock / Hypotension - POA due to IVVD and anemia.  BP better after hydration.  Levophed stopped.  Hold lasix, lisinopril.  Transfer out of ICU    Acute kidney injury / Dehydration / Hypoalbuminemia / Chronic kidney disease, stage III - POA, due to poor PO intake.  Hydrated and follow. Check US, urine lytes.  Consider nephrology consulted if not improving.   Likely has CKD3.  Nutrition support.      Overdose of coumadin / Chronic anticoagulation - POA, unclear why INR spiked.  Holding coumadin. Give Vitamin K.  Outpatient cardiology to consider Watchman etc.    Anemia / Hematoma - POA, Awaiting CT of leg.  Bleeding stopped.  Check serologies.  Monitor Hb    Chronic atrial fibrillation / Hypertension - POA, pulse rate a bit high due to JESSI.  Resume low dose coreg to avoid rebound.  Holding coumadin.  Stopping lisinopril and Lasix.    Neuropathy / Chronic pain - Continue gabapentin and Ultram.  These contribute to low BP and fall risk. PT OT evaluation. Add scheduled tylenol.    Lymphedema - Stop Lasix forever fr this issue.  Elevation, SHENA hose and fluid restriction are needed.  Possible follow up in lymphedema clinic.    Hyperlipidemia - Check panel.  No hx of CAD nor CVA to demand tight control. Continue statin for now       Subjective:     Chief Complaint:  Severe l foot and leg pain    ROS:  (bold if positive, if negative)    Tolerating some PT  Tolerating Diet        Objective:     Last 24hrs VS reviewed since prior progress note. Most recent are:    Vitals:    24 0945 24 1000 24 1015 24 1030   BP: (!) 81/38 (!) 87/51 (!) 103/46  (!) 92/46   Pulse: 88 88 89 95   Resp: 18 16 13 15   Temp:       TempSrc:       SpO2: 99% 100% 100% 100%   Weight:       Height:          @[id number]@       Intake/Output Summary (Last 24 hours) at 8/25/2024 0706  Last data filed at 8/25/2024 0609  Gross per 24 hour   Intake 1591.21 ml   Output --   Net 1591.21 ml        Physical Exam:    Gen:  Well-developed, well-nourished, in no acute distress  HEENT:  Pink conjunctivae, PERRL, hearing intact to voice, moist mucous membranes  Neck:  Supple, without masses, thyroid non-tender  Resp:  No accessory muscle use, clear breath sounds without wheezes rales or rhonchi  Card:  No murmurs, irregular S1, S2 without thrills, bruits, L>R 2+ peripheral edema  Abd:  Soft, non-tender, non-distended, normoactive bowel sounds are present, no mass  Lymph:  No cervical or inguinal adenopathy  Musc:  No cyanosis or clubbing  Skin:  Leg hematoma wrapped, skin turgor is good  Neuro:  Cranial nerves are grossly intact, mild motor weakness, follows commands   Psych:  Good insight, oriented to person, place and time, alert    Telemetry reviewed:   normal sinus rhythm  __________________________________________________________________  Medications Reviewed: (see below)  Medications:     Current Facility-Administered Medications   Medication Dose Route Frequency    0.9 % sodium chloride infusion   IntraVENous Continuous    polyethylene glycol (GLYCOLAX) packet 17 g  17 g Oral BID    carvedilol (COREG) tablet 6.25 mg  6.25 mg Oral BID WC    traMADol (ULTRAM) tablet 50 mg  50 mg Oral Q6H PRN    sodium chloride flush 0.9 % injection 5-40 mL  5-40 mL IntraVENous 2 times per day    sodium chloride flush 0.9 % injection 5-40 mL  5-40 mL IntraVENous PRN    0.9 % sodium chloride infusion   IntraVENous PRN    ondansetron (ZOFRAN-ODT) disintegrating tablet 4 mg  4 mg Oral Q8H PRN    acetaminophen (TYLENOL) tablet 650 mg  650 mg Oral Q6H PRN    oxyCODONE (ROXICODONE) immediate release tablet 5 mg

## 2024-08-25 NOTE — H&P
History and Physical    Date of Service:  8/24/2024  Primary Care Provider: No primary care provider on file.  Source of information: patient, daughter     Chief Complaint: Emesis and Wound Check      History of Presenting Illness:   Olimpia Hernandez is a 79 y.o. female with past medical history of Afib on Warfarin, HTN, Allergies, Arthritis, HLD who presents with wound of left lower extremity.     Yesterday patient's 100 pound boxer jumped up on the bed landing on her leg causing a hematoma.  She got evaluated at the [facility] emergency room where they drained the hematoma and stitched the wound and discharged her home on Bactrim patient and her daughter note that there has been steady oozing of blood from the wound and when the daughter attempted to change her dressing this afternoon around 230 she noted that the dressing had been soaked through and she noted blisters and brought her back to the emergency room.  Reports that she did not take her Coumadin yesterday evening as she was told by the ER doctor did not take it while on antibiotics  Patient also felt dizzy and had nausea and vomiting this afternoon prior to coming to the ER.       REVIEW OF SYSTEMS:       I am not able to complete the review of systems because:   The patient is intubated and sedated    The patient has altered mental status due to his acute medical problems    The patient has baseline aphasia from prior stroke(s)    The patient has baseline dementia and is not reliable historian    The patient is in acute medical distress and unable to provide information           Total of 12 systems reviewed as follows:       POSITIVE= underlined text  Negative = text not underlined  General:  fever, chills, sweats, generalized weakness, weight loss/gain,      loss of appetite   Eyes:    blurred vision, eye pain, loss of vision, double vision  ENT:    rhinorrhea, pharyngitis   Respiratory:   cough, sputum production, SOB, CAMPOS, wheezing, pleuritic  clinical presentation, there is a high level of concern for decompensation if discharged from the emergency department. Complex decision making was performed, which includes reviewing the patient's available past medical records, laboratory results, and imaging studies.    Principal Problem:    Hypotension  Resolved Problems:    * No resolved hospital problems. *      Hypotension  Tachycardia  Elevated lactic acid - due to hypoperfusion in the setting of hypotension  Suspect due to volume depletion and blood loss.  Procalcitonin less than 0.05, normal white count  No localizing signs and symptoms of infection. Infection work up negative  I am not convinced that this patient has an infection I suspect that her hypotension is as a result of volume depletion.  She has responded appropriately to IV fluids.  Hemoglobin greater than 7.  -Continue maintenance infusion  -Hold off IV antibiotics  -Obtain CT left lower extremity to evaluate if there is any internal hematoma. Administer vitamin K    LLE Hematoma  D/t to trauma in the setting of supratherapeutic INR  - Wound care to incision   - CT LLE to evaluate  - Neurovascular checks. High risk for compartment syndrome   - Administer 2.5 mg vitamin K orally       Normochromic normocytic anemia   Suspect due to acute blood loss  - check anemia panel     HTN   Hold lisinopril and coreg     Afib   Supratherapeutic INR  Hold coreg due to hypotension     - Trend INR  - Hold coumadin, pharmacy consulted   - Administer 2.5 mg vitamin K orally     HLD   Continue atorvastatin     RLS  Bilateral lower extremity pain   - continue Gabapentin     DIET: ADULT DIET; Regular   ISOLATION PRECAUTIONS: No active isolations  CODE STATUS: [unfilled]   DVT PROPHYLAXIS: SCD on right leg  FUNCTIONAL STATUS PRIOR TO HOSPITALIZATION: ambulates with cane  ANTICIPATED DISCHARGE:  1-2 days  ANTICIPATED DISPOSITION: Home with    EMERGENCY CONTACT/SURROGATE DECISION MAKER:   Daughter Umbehant,Elizabeth

## 2024-08-25 NOTE — ACP (ADVANCE CARE PLANNING)
Advance Care Planning (ACP) Provider Note - Comprehensive     Date of ACP Conversation: 08/24/24  Persons included in Conversation:  patient and family  Length of ACP Conversation in minutes:  16 minutes      Diagnosis HTN  Authorized Decision Maker (if patient is incapable of making informed decisions):   This person is:  Next of Kin by law (only applies in absence of above)          General ACP for ALL Patients with Decision Making Capacity:   Importance of advance care planning, including choosing a healthcare agent to communicate patient's healthcare decisions if patient lost the ability to make decisions, such as after a sudden illness or accident  Understanding of the healthcare agent role was assessed and information provided  Exploration of values, goals, and preferences if recovery is not expected, even with continued medical treatment in the event of: Imminent death  Severe, permanent brain injury  \"In these circumstances, what matters most to you?\"  Care focused more on comfort or quality of life.  Opportunity offered to explore how cultural, Confucianist, spiritual, or personal beliefs would affect decisions for future care         For Serious or Chronic Illness:  Understanding of medical condition    Understanding of CPR, goals and expected outcomes, benefits and burdens discussed.  Information on CPR success rates provided (e.g. for CPR in hospital, survival to d/c at two weeks is 22%, for chronically ill or elderly/frail survival is less than 3%); Individual asked to communicate understanding of information in his/her own words.  Explored fears and concerns regarding CPR or possible outcomes    Interventions Provided:  Entered DNR order (If yes, complete Durable DNR form)

## 2024-08-25 NOTE — CONSENT
Informed Consent for Blood Component Transfusion Note    I have discussed with the patient the rationale for blood component transfusion; its benefits in treating or preventing fatigue, organ damage, or death; and its risk which includes mild transfusion reactions, rare risk of blood borne infection, or more serious but rare reactions. I have discussed the alternatives to transfusion, including the risk and consequences of not receiving transfusion. The patient had an opportunity to ask questions and had agreed to proceed with transfusion of blood components.    Electronically signed by ERNESTINA PEREZ MD on 8/24/24 at 10:37 PM EDT

## 2024-08-25 NOTE — PROGRESS NOTES
Occupational Therapy:  08/25/24    Orders received and appreciated, chart reviewed. Pt with continued supra-therapeutic INR (5.6 this AM, now resulting at 6.4) with ongoing medical management. Will defer for now and continue to follow to initiate OT evaluation as appropriate.     Thank you,  Ashley Dunlap, OTR/L

## 2024-08-26 ENCOUNTER — APPOINTMENT (OUTPATIENT)
Facility: HOSPITAL | Age: 79
End: 2024-08-26
Attending: INTERNAL MEDICINE
Payer: MEDICARE

## 2024-08-26 LAB
ABO + RH BLD: NORMAL
ANION GAP SERPL CALC-SCNC: 2 MMOL/L (ref 5–15)
BACTERIA SPEC CULT: NORMAL
BASOPHILS # BLD: 0 K/UL (ref 0–0.1)
BASOPHILS NFR BLD: 0 % (ref 0–1)
BLD PROD TYP BPU: NORMAL
BLOOD BANK BLOOD PRODUCT EXPIRATION DATE: NORMAL
BLOOD BANK DISPENSE STATUS: NORMAL
BLOOD BANK ISBT PRODUCT BLOOD TYPE: 9500
BLOOD BANK PRODUCT CODE: NORMAL
BLOOD BANK UNIT TYPE AND RH: NORMAL
BLOOD GROUP ANTIBODIES SERPL: NORMAL
BPU ID: NORMAL
BUN SERPL-MCNC: 27 MG/DL (ref 6–20)
BUN/CREAT SERPL: 17 (ref 12–20)
CALCIUM SERPL-MCNC: 7.9 MG/DL (ref 8.5–10.1)
CHLORIDE SERPL-SCNC: 114 MMOL/L (ref 97–108)
CO2 SERPL-SCNC: 24 MMOL/L (ref 21–32)
CREAT SERPL-MCNC: 1.61 MG/DL (ref 0.55–1.02)
CROSSMATCH RESULT: NORMAL
DIFFERENTIAL METHOD BLD: ABNORMAL
ECHO AO ARCH DIAM: 2.4 CM
ECHO AO ASC DIAM: 3.4 CM
ECHO AO ASCENDING AORTA INDEX: 1.78 CM/M2
ECHO AO ROOT DIAM: 3.4 CM
ECHO AO ROOT INDEX: 1.78 CM/M2
ECHO AV AREA PEAK VELOCITY: 2.8 CM2
ECHO AV AREA VTI: 2.9 CM2
ECHO AV AREA/BSA PEAK VELOCITY: 1.5 CM2/M2
ECHO AV AREA/BSA VTI: 1.5 CM2/M2
ECHO AV MEAN GRADIENT: 5 MMHG
ECHO AV MEAN VELOCITY: 1 M/S
ECHO AV PEAK GRADIENT: 9 MMHG
ECHO AV PEAK VELOCITY: 1.5 M/S
ECHO AV VELOCITY RATIO: 0.87
ECHO AV VTI: 24.1 CM
ECHO BSA: 1.96 M2
ECHO LA DIAMETER INDEX: 1.57 CM/M2
ECHO LA DIAMETER: 3 CM
ECHO LA TO AORTIC ROOT RATIO: 0.88
ECHO LV E' LATERAL VELOCITY: 11 CM/S
ECHO LV E' SEPTAL VELOCITY: 10 CM/S
ECHO LV EF PHYSICIAN: 55 %
ECHO LV FRACTIONAL SHORTENING: 13 % (ref 28–44)
ECHO LV INTERNAL DIMENSION DIASTOLE INDEX: 2.51 CM/M2
ECHO LV INTERNAL DIMENSION DIASTOLIC: 4.8 CM (ref 3.9–5.3)
ECHO LV INTERNAL DIMENSION SYSTOLIC INDEX: 2.2 CM/M2
ECHO LV INTERNAL DIMENSION SYSTOLIC: 4.2 CM
ECHO LV IVSD: 0.6 CM (ref 0.6–0.9)
ECHO LV MASS 2D: 88.3 G (ref 67–162)
ECHO LV MASS INDEX 2D: 46.2 G/M2 (ref 43–95)
ECHO LV POSTERIOR WALL DIASTOLIC: 0.6 CM (ref 0.6–0.9)
ECHO LV RELATIVE WALL THICKNESS RATIO: 0.25
ECHO LVOT AREA: 3.1 CM2
ECHO LVOT AV VTI INDEX: 0.93
ECHO LVOT DIAM: 2 CM
ECHO LVOT MEAN GRADIENT: 3 MMHG
ECHO LVOT PEAK GRADIENT: 7 MMHG
ECHO LVOT PEAK VELOCITY: 1.3 M/S
ECHO LVOT STROKE VOLUME INDEX: 36.8 ML/M2
ECHO LVOT SV: 70.3 ML
ECHO LVOT VTI: 22.4 CM
ECHO MV A VELOCITY: 0.35 M/S
ECHO MV E DECELERATION TIME (DT): 226.9 MS
ECHO MV E VELOCITY: 0.8 M/S
ECHO MV E/A RATIO: 2.29
ECHO MV E/E' LATERAL: 7.27
ECHO MV E/E' RATIO (AVERAGED): 7.64
ECHO MV E/E' SEPTAL: 8
ECHO PULMONARY ARTERY END DIASTOLIC PRESSURE: 5 MMHG
ECHO PV MAX VELOCITY: 0.7 M/S
ECHO PV PEAK GRADIENT: 2 MMHG
ECHO PV REGURGITANT MAX VELOCITY: 1.2 M/S
ECHO RV FREE WALL PEAK S': 14 CM/S
ECHO RV TAPSE: 2 CM (ref 1.7–?)
ECHO RVOT MEAN GRADIENT: 1 MMHG
ECHO RVOT PEAK GRADIENT: 1 MMHG
ECHO RVOT PEAK VELOCITY: 0.6 M/S
ECHO RVOT VTI: 12 CM
ECHO TV REGURGITANT MAX VELOCITY: 2.84 M/S
ECHO TV REGURGITANT PEAK GRADIENT: 32 MMHG
EOSINOPHIL # BLD: 0.2 K/UL (ref 0–0.4)
EOSINOPHIL NFR BLD: 3 % (ref 0–7)
ERYTHROCYTE [DISTWIDTH] IN BLOOD BY AUTOMATED COUNT: 14.6 % (ref 11.5–14.5)
GLUCOSE SERPL-MCNC: 92 MG/DL (ref 65–100)
HCT VFR BLD AUTO: 24.1 % (ref 35–47)
HGB BLD-MCNC: 7.8 G/DL (ref 11.5–16)
IMM GRANULOCYTES # BLD AUTO: 0 K/UL (ref 0–0.04)
IMM GRANULOCYTES NFR BLD AUTO: 0 % (ref 0–0.5)
INR PPP: 1.3 (ref 0.9–1.1)
LYMPHOCYTES # BLD: 1.4 K/UL (ref 0.8–3.5)
LYMPHOCYTES NFR BLD: 19 % (ref 12–49)
MCH RBC QN AUTO: 29.1 PG (ref 26–34)
MCHC RBC AUTO-ENTMCNC: 32.4 G/DL (ref 30–36.5)
MCV RBC AUTO: 89.9 FL (ref 80–99)
MONOCYTES # BLD: 0.7 K/UL (ref 0–1)
MONOCYTES NFR BLD: 10 % (ref 5–13)
NEUTS SEG # BLD: 5 K/UL (ref 1.8–8)
NEUTS SEG NFR BLD: 68 % (ref 32–75)
NRBC # BLD: 0 K/UL (ref 0–0.01)
NRBC BLD-RTO: 0 PER 100 WBC
PLATELET # BLD AUTO: 104 K/UL (ref 150–400)
PMV BLD AUTO: 11.6 FL (ref 8.9–12.9)
POTASSIUM SERPL-SCNC: 3.9 MMOL/L (ref 3.5–5.1)
PROTHROMBIN TIME: 13.2 SEC (ref 9–11.1)
RBC # BLD AUTO: 2.68 M/UL (ref 3.8–5.2)
SERVICE CMNT-IMP: NORMAL
SODIUM SERPL-SCNC: 140 MMOL/L (ref 136–145)
SPECIMEN EXP DATE BLD: NORMAL
UNIT DIVISION: 0
UNIT ISSUE DATE/TIME: NORMAL
WBC # BLD AUTO: 7.3 K/UL (ref 3.6–11)

## 2024-08-26 PROCEDURE — C8929 TTE W OR WO FOL WCON,DOPPLER: HCPCS

## 2024-08-26 PROCEDURE — 2580000003 HC RX 258: Performed by: STUDENT IN AN ORGANIZED HEALTH CARE EDUCATION/TRAINING PROGRAM

## 2024-08-26 PROCEDURE — 97165 OT EVAL LOW COMPLEX 30 MIN: CPT

## 2024-08-26 PROCEDURE — 2580000003 HC RX 258: Performed by: INTERNAL MEDICINE

## 2024-08-26 PROCEDURE — 93306 TTE W/DOPPLER COMPLETE: CPT | Performed by: INTERNAL MEDICINE

## 2024-08-26 PROCEDURE — 36415 COLL VENOUS BLD VENIPUNCTURE: CPT

## 2024-08-26 PROCEDURE — 94640 AIRWAY INHALATION TREATMENT: CPT

## 2024-08-26 PROCEDURE — 97530 THERAPEUTIC ACTIVITIES: CPT

## 2024-08-26 PROCEDURE — 80048 BASIC METABOLIC PNL TOTAL CA: CPT

## 2024-08-26 PROCEDURE — 1100000000 HC RM PRIVATE

## 2024-08-26 PROCEDURE — 6370000000 HC RX 637 (ALT 250 FOR IP): Performed by: STUDENT IN AN ORGANIZED HEALTH CARE EDUCATION/TRAINING PROGRAM

## 2024-08-26 PROCEDURE — 85610 PROTHROMBIN TIME: CPT

## 2024-08-26 PROCEDURE — 6370000000 HC RX 637 (ALT 250 FOR IP): Performed by: NURSE PRACTITIONER

## 2024-08-26 PROCEDURE — 6370000000 HC RX 637 (ALT 250 FOR IP): Performed by: INTERNAL MEDICINE

## 2024-08-26 PROCEDURE — 6360000004 HC RX CONTRAST MEDICATION: Performed by: INTERNAL MEDICINE

## 2024-08-26 PROCEDURE — 97116 GAIT TRAINING THERAPY: CPT

## 2024-08-26 PROCEDURE — 85025 COMPLETE CBC W/AUTO DIFF WBC: CPT

## 2024-08-26 PROCEDURE — 97162 PT EVAL MOD COMPLEX 30 MIN: CPT

## 2024-08-26 RX ORDER — IPRATROPIUM BROMIDE AND ALBUTEROL SULFATE 2.5; .5 MG/3ML; MG/3ML
1 SOLUTION RESPIRATORY (INHALATION) EVERY 4 HOURS PRN
Status: DISCONTINUED | OUTPATIENT
Start: 2024-08-26 | End: 2024-08-28

## 2024-08-26 RX ORDER — WARFARIN SODIUM 5 MG/1
2.5 TABLET ORAL
Status: DISCONTINUED | OUTPATIENT
Start: 2024-08-26 | End: 2024-08-26

## 2024-08-26 RX ORDER — MIDODRINE HYDROCHLORIDE 5 MG/1
2.5 TABLET ORAL
Status: DISCONTINUED | OUTPATIENT
Start: 2024-08-26 | End: 2024-08-29

## 2024-08-26 RX ADMIN — GABAPENTIN 100 MG: 100 CAPSULE ORAL at 20:03

## 2024-08-26 RX ADMIN — OXYCODONE 5 MG: 5 TABLET ORAL at 17:36

## 2024-08-26 RX ADMIN — IPRATROPIUM BROMIDE AND ALBUTEROL SULFATE 1 DOSE: 2.5; .5 SOLUTION RESPIRATORY (INHALATION) at 01:57

## 2024-08-26 RX ADMIN — GABAPENTIN 100 MG: 100 CAPSULE ORAL at 14:47

## 2024-08-26 RX ADMIN — OXYCODONE 5 MG: 5 TABLET ORAL at 13:14

## 2024-08-26 RX ADMIN — SODIUM CHLORIDE: 9 INJECTION, SOLUTION INTRAVENOUS at 16:02

## 2024-08-26 RX ADMIN — ATORVASTATIN CALCIUM 40 MG: 20 TABLET, FILM COATED ORAL at 17:28

## 2024-08-26 RX ADMIN — MIDODRINE HYDROCHLORIDE 2.5 MG: 5 TABLET ORAL at 17:28

## 2024-08-26 RX ADMIN — SODIUM CHLORIDE: 9 INJECTION, SOLUTION INTRAVENOUS at 05:21

## 2024-08-26 RX ADMIN — OXYCODONE 5 MG: 5 TABLET ORAL at 01:26

## 2024-08-26 RX ADMIN — TRAMADOL HYDROCHLORIDE 50 MG: 50 TABLET ORAL at 05:34

## 2024-08-26 RX ADMIN — MIDODRINE HYDROCHLORIDE 2.5 MG: 5 TABLET ORAL at 13:14

## 2024-08-26 RX ADMIN — SODIUM CHLORIDE, PRESERVATIVE FREE 10 ML: 5 INJECTION INTRAVENOUS at 09:27

## 2024-08-26 RX ADMIN — GABAPENTIN 100 MG: 100 CAPSULE ORAL at 09:26

## 2024-08-26 RX ADMIN — SODIUM CHLORIDE, PRESERVATIVE FREE 10 ML: 5 INJECTION INTRAVENOUS at 19:53

## 2024-08-26 RX ADMIN — PERFLUTREN 1.5 ML: 6.52 INJECTION, SUSPENSION INTRAVENOUS at 15:45

## 2024-08-26 ASSESSMENT — PAIN SCALES - GENERAL
PAINLEVEL_OUTOF10: 3
PAINLEVEL_OUTOF10: 6
PAINLEVEL_OUTOF10: 4
PAINLEVEL_OUTOF10: 7
PAINLEVEL_OUTOF10: 4
PAINLEVEL_OUTOF10: 6
PAINLEVEL_OUTOF10: 5
PAINLEVEL_OUTOF10: 2
PAINLEVEL_OUTOF10: 4
PAINLEVEL_OUTOF10: 4

## 2024-08-26 ASSESSMENT — PAIN DESCRIPTION - LOCATION
LOCATION: LEG
LOCATION: LEG
LOCATION: LEG;HIP
LOCATION: BACK;LEG
LOCATION: BACK;LEG
LOCATION: LEG

## 2024-08-26 ASSESSMENT — PAIN - FUNCTIONAL ASSESSMENT
PAIN_FUNCTIONAL_ASSESSMENT: ACTIVITIES ARE NOT PREVENTED
PAIN_FUNCTIONAL_ASSESSMENT: ACTIVITIES ARE NOT PREVENTED

## 2024-08-26 ASSESSMENT — PAIN DESCRIPTION - ORIENTATION
ORIENTATION: LEFT

## 2024-08-26 ASSESSMENT — PAIN DESCRIPTION - DESCRIPTORS: DESCRIPTORS: ACHING

## 2024-08-26 NOTE — CARE COORDINATION
Update:    I met with pt to discuss SNF preferences.  Preferences for SNF are:    Janelle Jones  [facility]    Clinicals sent to each facility seeking SNF placement for rehab.    Candi Urbina RN

## 2024-08-26 NOTE — PLAN OF CARE
Problem: Safety - Adult  Goal: Free from fall injury  8/26/2024 0011 by Jolene Spangler, RN  Outcome: Progressing  8/25/2024 1747 by Rita Elise RN  Outcome: Progressing     Problem: ABCDS Injury Assessment  Goal: Absence of physical injury  8/26/2024 0011 by Jolene Spangler, RN  Outcome: Progressing  8/25/2024 1747 by Rita Elise RN  Outcome: Progressing     Problem: Pain  Goal: Verbalizes/displays adequate comfort level or baseline comfort level  8/26/2024 0011 by Jolene Spangler RN  Outcome: Progressing  8/25/2024 1747 by Rita Elise RN  Outcome: Progressing     Problem: Skin/Tissue Integrity  Goal: Absence of new skin breakdown  Description: 1.  Monitor for areas of redness and/or skin breakdown  2.  Assess vascular access sites hourly  3.  Every 4-6 hours minimum:  Change oxygen saturation probe site  4.  Every 4-6 hours:  If on nasal continuous positive airway pressure, respiratory therapy assess nares and determine need for appliance change or resting period.  8/26/2024 0011 by Jolene Spangler RN  Outcome: Progressing  8/25/2024 1747 by Rita Elise RN  Outcome: Progressing

## 2024-08-26 NOTE — PROGRESS NOTES
[facility] Dosing Services: 08/26/24   Consult for Warfarin Dosing by Pharmacy by Dr. John  Consult provided for this 79 y.o. female , for indication of Afib/flutter  Day of Therapy: continued from home  According to the patient, 3 mg on MWF,  2 mg on TT, no warfarin on weekends    Dose to achieve an INR goal of 2-3    A/Plan:  HOLD per MD Parker, with noted H/H/PLTs  Current INR = 1.3, s/p Vit K  Follow up with attending tmrw for when to resume warfarin    Significant drug interactions, such as enoxaparin: n/a  PT/INR Lab Results   Component Value Date/Time    INR 1.3 08/26/2024 01:17 PM      Platelets Lab Results   Component Value Date/Time     08/26/2024 02:05 AM      H/H Lab Results   Component Value Date/Time    HGB 7.8 08/26/2024 02:05 AM        Pharmacy to follow daily and will provide subsequent Warfarin dosing based on clinical status.  Naresh Mckeon [facility]) Contact information [phone]

## 2024-08-26 NOTE — PROGRESS NOTES
[facility]    Hospitalist Progress Note    NAME: Olimpia Hernandez   :  1945  MRM:  [id number]    Date/Time of service 2024  1:16 PM    To assist coordination of care and communication with nursing and staff, this note may be preliminary early in the day, but finalized by end of the day.        Assessment and Plan:     Hypovolemic shock / Hypotension - POA due to IVVD and anemia.  BP better after hydration, but not great. Add midodrine. Check ECHO.  Levophed stopped.  Hold lasix, lisinopril.    Acute kidney injury / Dehydration / Hypoalbuminemia / Chronic kidney disease, stage III - POA, due to poor PO intake.  Hydrated and improved. Check US, urine lytes.  Likely has CKD3.  Nutrition support.      Overdose of coumadin / Chronic anticoagulation - POA, unclear why INR spiked.  Holding coumadin. Gave Vitamin K.  Outpatient cardiology to consider Watchman etc.    Anemia / Thrombocytopenia / Hematoma - POA, CT of leg read as hematoma vs abscess. No other sign of infection.  Procalcitonin undetectable.  No SIRS.  Bleeding stopped.  Hb stable.    Chronic atrial fibrillation / Hypertension - POA, pulse rate a bit high due to JESSI.  Resume low dose coreg to avoid rebound.  Holding coumadin.  Stopping lisinopril and Lasix.    Neuropathy / Chronic pain - Continue gabapentin and Ultram.  These contribute to low BP and fall risk. PT OT evaluation. Add scheduled tylenol.    Lymphedema - Stop Lasix forever fr this issue.  Elevation, SHENA hose and fluid restriction are needed.  Possible follow up in lymphedema clinic.    Hyperlipidemia - Check panel.  No hx of CAD nor CVA to demand tight control. Continue statin for now       Subjective:     Chief Complaint:  Severe left foot and leg pain    ROS:  (bold if positive, if negative)    Tolerating some PT  Tolerating Diet        Objective:     Last 24hrs VS reviewed since prior progress note. Most recent are:    Vitals:    24 1022 24 1027

## 2024-08-26 NOTE — PLAN OF CARE
Problem: Discharge Planning  Goal: Discharge to home or other facility with appropriate resources  Outcome: Progressing     Problem: Safety - Adult  Goal: Free from fall injury  Outcome: Progressing     Problem: ABCDS Injury Assessment  Goal: Absence of physical injury  Outcome: Progressing     Problem: Pain  Goal: Verbalizes/displays adequate comfort level or baseline comfort level  Outcome: Progressing     Problem: Skin/Tissue Integrity  Goal: Absence of new skin breakdown  Description: 1.  Monitor for areas of redness and/or skin breakdown  2.  Assess vascular access sites hourly  3.  Every 4-6 hours minimum:  Change oxygen saturation probe site  4.  Every 4-6 hours:  If on nasal continuous positive airway pressure, respiratory therapy assess nares and determine need for appliance change or resting period.  Outcome: Progressing     Problem: Respiratory - Adult  Goal: Achieves optimal ventilation and oxygenation  8/26/2024 1804 by Andrea Harry RN  Outcome: Progressing  8/26/2024 0528 by Mis Mclaughlin, RT  Outcome: Progressing     Problem: Occupational Therapy - Adult  Goal: By Discharge: Performs self-care activities at highest level of function for planned discharge setting.  See evaluation for individualized goals.  Description: FUNCTIONAL STATUS PRIOR TO ADMISSION:  Patient was independent with ADLs and functional mobility/ ambulatory with a rollator. Doing dishes, laundry, and dog care. Also driving.  No recent falls.      HOME SUPPORT: Patient resided at home with her daughter and son-in-law and did not require assistance.    Occupational Therapy Goals:  Initiated 8/26/2024  1.  Patient will perform bathing with Minimal Assist within 7 day(s).  2.  Patient will perform lower body dressing with Minimal Assist using AE within 7 day(s).  3.  Patient will perform grooming standing at sink with Contact Guard Assist within 7 day(s).  4.  Patient will perform toilet transfers with Minimal Assist

## 2024-08-26 NOTE — WOUND CARE
Wound Care Note:     New consult for \"left leg wound\"   Seen in A465/01     79 y.o. y/o female admitted on 8/24/2024  Admitted for Hypotension [I95.9]  Septic shock (HCC) [A41.9, R65.21]     History of HLD, HTN, Lymphedema, neuropathy, CKD, chronic pain, chronic anticoagulation, a-fib   WBC = 7.3  No indication for wound culture  Diet: ADULT DIET; Regular           Assessment:   Patient is alert, cooperative and reports no pain.   Requires 1 assist to reposition for assessment.      1. POA left lower posterior leg  Trauma wound  Moist, red, purple, serosanguinous drainage, painful to touch, edematous, sutures placed in ER intact, with surrounding ecchymosis and peeling skin.   Tx: Cleansed with wound cleanser, applied xeroform, mepilex transfer and loosely wrapped with rolled gauze and secured with ace wrap.    Recommendations:    Left lower leg Cleanse with Wound cleanser, apply xeroform, mepilex transfer, loosely wrap with rolled gauze, and secure with ace wrap. Change daily.     Turn/reposition approximately every 2 hours  Offload heels with heels hanging off end of pillow at all times while in bed.  Sacral Foam dressing: lift to assess regularly; change as needed. Discontinue if incontinence is frequently soiling dressing.     Z-guard cream to buttocks and sacrum daily and as needed with incontinence care  Low Air Loss mattress: Use only flat sheet and one incontinence pad.     Discussed with Dr. Parker.    Transition of Care: Plan to follow weekly and as needed while admitted to hospital.    Kanwal Nuñez RN  [facility] Inpatient Wound Care Department  Office [phone]  Available via Private Practice

## 2024-08-26 NOTE — CARE COORDINATION
08/26/24 1145   Service Assessment   Patient Orientation Alert and Oriented   Cognition Alert   History Provided By Patient;Child/Family   Primary Caregiver Other (Comment)  (daughter now,pt was indepoendent prior to falling except for mobility)   Support Systems Children;Family Members   Patient's Healthcare Decision Maker is: Legal Next of Kin  (daughter-Elizabeth Darlene Umbehant @ [phone])   PCP Verified by CM Yes  (BUNNY Oquendo with [facility])   Last Visit to PCP Within last 3 months   Prior Functional Level Assistance with the following:;Mobility   Current Functional Level Assistance with the following:;Bathing;Dressing;Toileting;Cooking;Housework;Shopping;Mobility   Can patient return to prior living arrangement Yes   Ability to make needs known: Good   Family able to assist with home care needs: Yes   Would you like for me to discuss the discharge plan with any other family members/significant others, and if so, who? Yes  (my daughter)   Financial Resources Medicare   Community Resources None   CM/SW Referral Disease Management Education   Social/Functional History   Lives With Family   Type of Home House   Home Layout One level   Home Access Ramped entrance   Bathroom Shower/Tub Walk-in shower   Bathroom Equipment Shower chair;Toilet raiser;Grab bars around toilet;Grab bars in shower   Home Equipment Rollator  (\"standup walker\")   Receives Help From Family   ADL Assistance Independent   Homemaking Assistance Independent   Homemaking Responsibilities Yes   Ambulation Assistance Independent   Transfer Assistance Independent   Active  Yes   Occupation Retired   Discharge Planning   Type of Residence House   Living Arrangements Children   Current Services Prior To Admission None   Potential Assistance Needed N/A   DME Ordered? Other (comment)  (to be determined prior to discharge)   Potential Assistance Purchasing Medications No   Type of Home Care  Services None   Patient expects to be discharged to: House     I met with pt and her daughter Candi who goes by \"Paula.\"  Pt lives with Paula and Paula's family @ the address on demographics.  Daughter works as a  so can work it out with her employee to be @ home @ intervals to provide care and nourishment for her mother.  Preferred OP pharmacy is [facility].    I am following pt for potential home health or rehab needs.    Candi Urbina RN

## 2024-08-26 NOTE — PROGRESS NOTES
Problem: Occupational Therapy - Adult  Goal: By Discharge: Performs self-care activities at highest level of function for planned discharge setting.  See evaluation for individualized goals.  Description: FUNCTIONAL STATUS PRIOR TO ADMISSION:  Patient was independent with ADLs and functional mobility/ ambulatory with a rollator. Doing dishes, laundry, and dog care. Also driving.  No recent falls.      HOME SUPPORT: Patient resided at home with her daughter and son-in-law and did not require assistance.    Occupational Therapy Goals:  Initiated 8/26/2024  1.  Patient will perform bathing with Minimal Assist within 7 day(s).  2.  Patient will perform lower body dressing with Minimal Assist using AE within 7 day(s).  3.  Patient will perform grooming standing at sink with Contact Guard Assist within 7 day(s).  4.  Patient will perform toilet transfers with Minimal Assist  within 7 day(s).  5.  Patient will perform all aspects of toileting with Minimal Assist within 7 day(s).  6.  Patient will participate in upper extremity therapeutic exercise/activities with Supervision for 10 minutes within 7 day(s).    7.  Patient will utilize fall prevention techniques during functional activities with verbal cues within 7 day(s).    Outcome: Progressing     OCCUPATIONAL THERAPY EVALUATION    Patient: Olimpia Hernandez (79 y.o. female)  Date: 8/26/2024  Primary Diagnosis: Hypotension [I95.9]  Septic shock (HCC) [A41.9, R65.21]         Precautions:   fall                 ASSESSMENT :  Note patient, independent with a rollator at baseline and living at home with family, admitted for left lower leg wound (due to her large dog jumping on her)/ hypovolumic shock/ JESSI/ elevated INR (now s/p vitamin K).  She presents for OT evaluation A&Ox4 with endorsed fear of falling, lower left leg pain at wound, impaired balance, tachycardia with activity (up to 140s), and impaired LB reach for ADLS (reports difficulty at baseline now exacerbated by

## 2024-08-26 NOTE — PLAN OF CARE
Problem: Physical Therapy - Adult  Goal: By Discharge: Performs mobility at highest level of function for planned discharge setting.  See evaluation for individualized goals.  Description: FUNCTIONAL STATUS PRIOR TO ADMISSION: Patient was modified independent using a rollator for functional mobility.    HOME SUPPORT PRIOR TO ADMISSION: The patient lived with daughter and son-in-law in 1 level home with ramp to enter, she  did not require assistance for ADL's.    Physical Therapy Goals  Initiated 8/26/2024  1.  Patient will move from supine to sit and sit to supine in bed with minimal assistance within 7 day(s).    2.  Patient will perform sit to stand with contact guard assist within 7 day(s).  3.  Patient will transfer from bed to chair and chair to bed with contact guard assist using the least restrictive device within 7 day(s).  4.  Patient will ambulate with contact guard assist for 50 feet with the least restrictive device within 7 day(s).   Outcome: Progressing     PHYSICAL THERAPY EVALUATION    Patient: Olimpia Hernandez (79 y.o. female)  Date: 8/26/2024  Primary Diagnosis: Hypotension [I95.9]  Septic shock (HCC) [A41.9, R65.21]       Precautions: Restrictions/Precautions: General Precautions                      ASSESSMENT:  Patient is a 79 y.o. female with h/o Afib, HTN, HLD admitted to [facility] on 8/24/24 diagnosed with hypovolemic shock, JESSI, anemia, supratherapeutic INR and left lower extremity wound/hematoma following her dog jumping on her leg. She received vitamin K yesterday and cleared by nursing for therapy at this time. Patient seen for PT evaluation at this time and is agreeable to participation.     DEFICITS/IMPAIRMENTS:   The patient is limited by decreased functional mobility, independence in ADLs, high-level IADLs, ROM, strength, activity tolerance, balance, increased pain levels .    Based on the impairments listed above, the patient is functioning below baseline level of

## 2024-08-26 NOTE — PROGRESS NOTES
Spiritual Health Assessment/Progress Note  [facility]    Initial Encounter,  ,  ,      Name: Olimpia Hernandez MRN: [id number]    Age: 79 y.o.     Sex: female   Language: English   Voodoo: Religious   Hypotension     Date: 8/26/2024            Total Time Calculated: 36 min              Spiritual Assessment began in [facility] A4 INTENSIVE CARE UNIT        Referral/Consult From: Rounding   Encounter Overview/Reason: Initial Encounter  Service Provided For: Patient    Catalina, Belief, Meaning:   Patient is connected with a catalina tradition or spiritual practice  Family/Friends No family/friends present      Importance and Influence:  Patient has no beliefs influential to healthcare decision-making identified during this visit  Family/Friends no family/friends present    Community:  Patient daughter named Paula not present in room.   Family/Friends Unknown    Assessment and Plan of Care:   Patient Interventions include: Facilitated expression of thoughts and feelings  Family/Friends Interventions include: Other: Unknown    Patient Plan of Care: Spiritual Care available upon further referral  Family/Friends Plan of Care: Spiritual Care available upon further referral    Chart review. Met and conversed with patient. Patient was calm, coping, receptive, and was engaged in conversation. Patient has three children, three grand children, and one great grand child. Patient is of the Evangelical Christianity, but did not welcome prayer. Patient moved from [place] to [place]. Patient's spouse passed away, thus she moved in with her daughter in [place]. Patient communicates that her dog jumped up on her which because of brought her to receive medical care. No family at bedside. Provided ministry of presence and listened intently while patient shared. Patient was thankful for the visit offered. Please contact spiritual health services for further assistance needed.    Electronically signed by Chaplain Karla on 8/26/2024  at 4:08 PM

## 2024-08-27 LAB
ALBUMIN SERPL-MCNC: 2.6 G/DL (ref 3.5–5)
ALBUMIN/GLOB SERPL: 1 (ref 1.1–2.2)
ALP SERPL-CCNC: 80 U/L (ref 45–117)
ALT SERPL-CCNC: 23 U/L (ref 12–78)
ANION GAP SERPL CALC-SCNC: 7 MMOL/L (ref 5–15)
AST SERPL-CCNC: 30 U/L (ref 15–37)
BILIRUB SERPL-MCNC: 1.2 MG/DL (ref 0.2–1)
BUN SERPL-MCNC: 18 MG/DL (ref 6–20)
BUN/CREAT SERPL: 14 (ref 12–20)
CALCIUM SERPL-MCNC: 7.8 MG/DL (ref 8.5–10.1)
CHLORIDE SERPL-SCNC: 110 MMOL/L (ref 97–108)
CO2 SERPL-SCNC: 22 MMOL/L (ref 21–32)
CREAT SERPL-MCNC: 1.29 MG/DL (ref 0.55–1.02)
ERYTHROCYTE [DISTWIDTH] IN BLOOD BY AUTOMATED COUNT: 14.7 % (ref 11.5–14.5)
GLOBULIN SER CALC-MCNC: 2.5 G/DL (ref 2–4)
GLUCOSE SERPL-MCNC: 109 MG/DL (ref 65–100)
HCT VFR BLD AUTO: 22.2 % (ref 35–47)
HGB BLD-MCNC: 7.3 G/DL (ref 11.5–16)
INR PPP: 1.1 (ref 0.9–1.1)
MAGNESIUM SERPL-MCNC: 1.8 MG/DL (ref 1.6–2.4)
MCH RBC QN AUTO: 29.2 PG (ref 26–34)
MCHC RBC AUTO-ENTMCNC: 32.9 G/DL (ref 30–36.5)
MCV RBC AUTO: 88.8 FL (ref 80–99)
NRBC # BLD: 0 K/UL (ref 0–0.01)
NRBC BLD-RTO: 0 PER 100 WBC
PHOSPHATE SERPL-MCNC: 1.5 MG/DL (ref 2.6–4.7)
PLATELET # BLD AUTO: 114 K/UL (ref 150–400)
PMV BLD AUTO: 11.6 FL (ref 8.9–12.9)
POTASSIUM SERPL-SCNC: 3.6 MMOL/L (ref 3.5–5.1)
PROT SERPL-MCNC: 5.1 G/DL (ref 6.4–8.2)
PROTHROMBIN TIME: 11.4 SEC (ref 9–11.1)
RBC # BLD AUTO: 2.5 M/UL (ref 3.8–5.2)
SODIUM SERPL-SCNC: 139 MMOL/L (ref 136–145)
WBC # BLD AUTO: 7.1 K/UL (ref 3.6–11)

## 2024-08-27 PROCEDURE — 6370000000 HC RX 637 (ALT 250 FOR IP): Performed by: STUDENT IN AN ORGANIZED HEALTH CARE EDUCATION/TRAINING PROGRAM

## 2024-08-27 PROCEDURE — 97530 THERAPEUTIC ACTIVITIES: CPT

## 2024-08-27 PROCEDURE — 36415 COLL VENOUS BLD VENIPUNCTURE: CPT

## 2024-08-27 PROCEDURE — 85027 COMPLETE CBC AUTOMATED: CPT

## 2024-08-27 PROCEDURE — 85610 PROTHROMBIN TIME: CPT

## 2024-08-27 PROCEDURE — 97110 THERAPEUTIC EXERCISES: CPT

## 2024-08-27 PROCEDURE — 94761 N-INVAS EAR/PLS OXIMETRY MLT: CPT

## 2024-08-27 PROCEDURE — 1100000000 HC RM PRIVATE

## 2024-08-27 PROCEDURE — 80053 COMPREHEN METABOLIC PANEL: CPT

## 2024-08-27 PROCEDURE — 84100 ASSAY OF PHOSPHORUS: CPT

## 2024-08-27 PROCEDURE — 6370000000 HC RX 637 (ALT 250 FOR IP): Performed by: INTERNAL MEDICINE

## 2024-08-27 PROCEDURE — 97535 SELF CARE MNGMENT TRAINING: CPT

## 2024-08-27 PROCEDURE — 83735 ASSAY OF MAGNESIUM: CPT

## 2024-08-27 PROCEDURE — 97116 GAIT TRAINING THERAPY: CPT

## 2024-08-27 PROCEDURE — 2580000003 HC RX 258: Performed by: STUDENT IN AN ORGANIZED HEALTH CARE EDUCATION/TRAINING PROGRAM

## 2024-08-27 RX ORDER — ACETAMINOPHEN 325 MG/1
650 TABLET ORAL EVERY 6 HOURS PRN
Qty: 120 TABLET | Refills: 3 | Status: SHIPPED
Start: 2024-08-27

## 2024-08-27 RX ORDER — MIDODRINE HYDROCHLORIDE 2.5 MG/1
2.5 TABLET ORAL
Qty: 90 TABLET | Refills: 3 | Status: SHIPPED | OUTPATIENT
Start: 2024-08-27 | End: 2024-08-29 | Stop reason: HOSPADM

## 2024-08-27 RX ORDER — WARFARIN SODIUM 5 MG/1
2.5 TABLET ORAL
Status: COMPLETED | OUTPATIENT
Start: 2024-08-27 | End: 2024-08-27

## 2024-08-27 RX ORDER — GABAPENTIN 100 MG/1
100 CAPSULE ORAL 3 TIMES DAILY
Qty: 30 CAPSULE | Refills: 0 | Status: SHIPPED | OUTPATIENT
Start: 2024-08-27 | End: 2024-08-29

## 2024-08-27 RX ORDER — TRAMADOL HYDROCHLORIDE 50 MG/1
50 TABLET ORAL 2 TIMES DAILY PRN
Qty: 10 TABLET | Refills: 0 | Status: SHIPPED | OUTPATIENT
Start: 2024-08-27 | End: 2024-08-29

## 2024-08-27 RX ADMIN — OXYCODONE 5 MG: 5 TABLET ORAL at 08:24

## 2024-08-27 RX ADMIN — GABAPENTIN 100 MG: 100 CAPSULE ORAL at 08:24

## 2024-08-27 RX ADMIN — CARVEDILOL 6.25 MG: 6.25 TABLET, FILM COATED ORAL at 17:30

## 2024-08-27 RX ADMIN — MIDODRINE HYDROCHLORIDE 2.5 MG: 5 TABLET ORAL at 17:30

## 2024-08-27 RX ADMIN — SODIUM CHLORIDE, PRESERVATIVE FREE 10 ML: 5 INJECTION INTRAVENOUS at 08:25

## 2024-08-27 RX ADMIN — MIDODRINE HYDROCHLORIDE 2.5 MG: 5 TABLET ORAL at 08:25

## 2024-08-27 RX ADMIN — SODIUM CHLORIDE, PRESERVATIVE FREE 10 ML: 5 INJECTION INTRAVENOUS at 21:22

## 2024-08-27 RX ADMIN — OXYCODONE 5 MG: 5 TABLET ORAL at 13:38

## 2024-08-27 RX ADMIN — POLYETHYLENE GLYCOL 3350 17 G: 17 POWDER, FOR SOLUTION ORAL at 08:25

## 2024-08-27 RX ADMIN — GABAPENTIN 100 MG: 100 CAPSULE ORAL at 21:21

## 2024-08-27 RX ADMIN — MIDODRINE HYDROCHLORIDE 2.5 MG: 5 TABLET ORAL at 11:46

## 2024-08-27 RX ADMIN — GABAPENTIN 100 MG: 100 CAPSULE ORAL at 13:38

## 2024-08-27 RX ADMIN — WARFARIN SODIUM 2.5 MG: 5 TABLET ORAL at 17:29

## 2024-08-27 ASSESSMENT — PAIN SCALES - GENERAL
PAINLEVEL_OUTOF10: 3
PAINLEVEL_OUTOF10: 6
PAINLEVEL_OUTOF10: 6
PAINLEVEL_OUTOF10: 3

## 2024-08-27 ASSESSMENT — PAIN DESCRIPTION - LOCATION
LOCATION: LEG;BACK
LOCATION: LEG;BACK

## 2024-08-27 NOTE — CARE COORDINATION
.td  3:38 PM  Care Management Progress Note    Reason for Admission:   Hypotension [I95.9]  Septic shock (HCC) [A41.9, R65.21]         Patient Admission Status: Inpatient  RUR: Readmission Risk Score: 16.4    Hospitalization in the last 30 days (Readmission):  No        Transition of care plan:  Discharge pending dispo. Per attending in IDRs, pt is medically stable for DC. Therapy treating.   SNF - CM received acceptance from [facility] and [facility] (only preferred facility in network). Pt and pt's DTR notified and agreeable. [facility] and [facility] are submitting for auth.  Outpatient follow-up.  Transport need TBD.

## 2024-08-27 NOTE — ACP (ADVANCE CARE PLANNING)
Advance Care Planning     Advance Care Planning Inpatient Note  [facility] Department    Today's Date: 8/27/2024  Unit: [facility] B4 MULTI-SPECIALTY ORTHOPEDICS 1    Received request from IDT Member.  Upon review of chart and communication with care team, patient's decision making abilities are not in question.. Patient was/were present in the room, but appeared to be resting comfortably.  honored patient's need for rest.    Goals of ACP Conversation:  Discuss advance care planning documents    Health Care Decision Makers:     No healthcare decision makers have been documented.    Summary:  No Decision Maker named by patient at this time    Advance Care Planning Documents (Patient Wishes):  None     Assessment:   visit for the patient on Post Surg with an Advance Medical Directive (AMD) consult. Reviewed pt's chart and spoke with pt's nurse. Upon arrival, pt appeared to be resting comfortably.  honored pt's need for rest. Left the AMD form, with a note sharing  services and how to reach a  if/when the patient wishes to complete an AMD.      Interventions:  Patient appeared to be resting comfortably.  honored patient's need for rest.    Care Preferences Communicated:   No    Outcomes/Plan:  ACP Discussion: Completed    Electronically signed by AIRAM Ramirez on 8/27/2024 at 4:08 PM

## 2024-08-27 NOTE — PROGRESS NOTES
[facility]    Hospitalist Progress Note    NAME: Olimpia Hernandez   :  1945  MRM:  [id number]    Date/Time of service 2024  10:29 AM    To assist coordination of care and communication with nursing and staff, this note may be preliminary early in the day, but finalized by end of the day.        Assessment and Plan:     Hypovolemic shock / Hypotension - POA due to IVVD and anemia.  BP better after hydration, but not great. Added midodrine. Normal ECHO.  Levophed stopped.  Stopped lasix, lisinopril.    Acute kidney injury / Dehydration / Hypoalbuminemia / Chronic kidney disease, stage III - POA, due to poor PO intake.  Hydrated and improved. Normal US. Moderate protein in urine.  Likely has CKD3.  Nutrition support.      Overdose of coumadin / Chronic anticoagulation - POA, unclear why INR spiked.  Held coumadin, can resume today. Gave Vitamin K.  Outpatient cardiology to consider Watchman etc.    Anemia / Thrombocytopenia / Hematoma - POA, CT of leg read as hematoma vs abscess. No other sign of infection.  Procalcitonin undetectable.  No SIRS.  Bleeding stopped.  Hb stable.  No Abx    Chronic atrial fibrillation / Hypertension - POA, pulse rate a bit high due to JESSI.  Resumed low dose coreg to avoid rebound.  Resume coumadin.  Stopping lisinopril and Lasix.    Neuropathy / Chronic pain - Continue gabapentin and Ultram.  These contribute to low BP and fall risk. PT OT evaluation recommend SNF and we are arranging that. Scheduled tylenol.    Lymphedema - Stop Lasix for this issue, due to hypotension.  Elevation, SHENA hose and fluid restriction are needed.  Possible follow up in lymphedema clinic.    Hyperlipidemia - LDL 42 on panel.  No hx of CAD nor CVA to demand tight control. Stop statin       Subjective:     Chief Complaint:  Improved left foot and leg pain    ROS:  (bold if positive, if negative)    Tolerating some PT  Tolerating Diet        Objective:     Last 24hrs VS reviewed

## 2024-08-27 NOTE — PLAN OF CARE
Problem: Physical Therapy - Adult  Goal: By Discharge: Performs mobility at highest level of function for planned discharge setting.  See evaluation for individualized goals.  Description: FUNCTIONAL STATUS PRIOR TO ADMISSION: Patient was modified independent using a rollator for functional mobility.    HOME SUPPORT PRIOR TO ADMISSION: The patient lived with daughter and son-in-law in 1 level home with ramp to enter, she  did not require assistance for ADL's.    Physical Therapy Goals  Initiated 8/26/2024  1.  Patient will move from supine to sit and sit to supine in bed with minimal assistance within 7 day(s).    2.  Patient will perform sit to stand with contact guard assist within 7 day(s).  3.  Patient will transfer from bed to chair and chair to bed with contact guard assist using the least restrictive device within 7 day(s).  4.  Patient will ambulate with contact guard assist for 50 feet with the least restrictive device within 7 day(s).   Outcome: Progressing     PHYSICAL THERAPY TREATMENT    Patient: Olimpia Hernandez (79 y.o. female)  Date: 8/27/2024  Diagnosis: Hypotension [I95.9]  Septic shock (HCC) [A41.9, R65.21] Hypotension      Precautions: General Precautions                      ASSESSMENT:  Patient continues to benefit from skilled PT services and is progressing towards goals. Patient with mild improvement in left pedal edema, but continues with decreased AROM left ankle and left knee. Patient participated in gentle AROM exercises and demonstrated progress with functional mobility transitioning out of bed to chair with rolling walker and minimal-moderate assist x 1 today. She continues to function well below her baseline independence level and is highly motivated to continue to participate in skilled PT interventions to achieve goals.         PLAN:  Patient continues to benefit from skilled intervention to address the above impairments.  Continue treatment per established plan of care.    Recommend  Constant support;Fair   Ambulation/Gait Training:     Gait  Gait Training: Yes  Overall Level of Assistance: Minimum assistance;Moderate assistance;Assist X1  Distance (ft): 5 Feet  Assistive Device: Gait belt;Walker, rolling  Interventions: Safety awareness training;Weight shifting training/pressure relief;Verbal cues  Base of Support: Widened  Speed/Alondra: Pace decreased (< 100 feet/min)  Step Length: Left shortened;Right shortened  Gait Abnormalities: Decreased step clearance;Antalgic                    Therapeutic Exercises:     EXERCISE   Sets   Reps   Active Active Assist   Passive Self ROM   Comments   Ankle Pumps bilateral  1 10 [x] [] [] []    Heel Slides  1 5 [x] [x] [] [] AAROM LLE   Straight Leg Raises 1 3 [x] [x] [] [] AAROM LLE           Pain Ratin/10  LLE up in chair at end of session  Pain Intervention(s):   nursing notified and addressing, rest, elevation, and repositioning    Activity Tolerance:   Good, Fair , and SpO2 stable on room air    After treatment:   Patient left in no apparent distress sitting up in chair, Call bell within reach, Bed/ chair alarm activated, Heels elevated for pressure relief, and Updated patient's board on functional status and mobility recommendations      COMMUNICATION/EDUCATION:   The patient's plan of care was discussed with: registered nurse and certified nursing assistant/patient care technician    Patient Education  Education Given To: Patient  Education Provided: Role of Therapy;Plan of Care;Fall Prevention Strategies;Equipment;Transfer Training  Education Method: Verbal  Barriers to Learning: None  Education Outcome: Verbalized understanding;Continued education needed;Demonstrated understanding      Rowena Mcrae PT  Minutes: 29

## 2024-08-27 NOTE — PROGRESS NOTES
Spiritual Health Assessment/Progress Note  [facility]    Attempted Encounter,  ,  ,      Name: Olimpia Hernandez MRN: [id number]    Age: 79 y.o.     Sex: female   Language: English   Congregation: Mormon   Hypotension     Date: 8/27/2024            Total Time Calculated: 35 min              Spiritual Assessment continued in [facility] B[facility] MULTI-SPECIALTY ORTHOPEDICS 1        Referral/Consult From: Multi-disciplinary team   Encounter Overview/Reason: Attempted Encounter  Service Provided For: Patient    Catalina, Belief, Meaning:   Patient Other: pt resting comfortably  Family/Friends No family/friends present      Importance and Influence:  Patient Other: pt sting comfortably  Family/Friends no family/friends present    Community:  Patient Other: unknown  Family/Friends Other: no family present    Assessment and Plan of Care:     Patient Interventions include: Other: Left the AMD form, with a note sharing  services and how to reach a  if/when the patient wishes to complete an AMD.  Family/Friends Interventions include: Other: no family present    Patient Plan of Care: Spiritual Care available upon further referral  Family/Friends Plan of Care: Spiritual Care available upon further referral     visit for the patient on Post Surg with an Advance Medical Directive (AMD) consult. Reviewed pt's chart and spoke with pt's nurse. Upon arrival, pt appeared to be resting comfortably.  honored pt's need for rest. Left the AMD form, with a note sharing  services and how to reach a  if/when the patient wishes to complete an AMD. Offered words of good intent for peace and comfort, haling and wholeness on the pt's behalf.    Chaplain Willis MS, MDiv, [facility]  Spiritual Health Services  Paging service: [phone] (PRAY)    Electronically signed by AIRAM Ramirez on 8/27/2024 at 4:13 PM

## 2024-08-27 NOTE — PLAN OF CARE
Problem: Occupational Therapy - Adult  Goal: By Discharge: Performs self-care activities at highest level of function for planned discharge setting.  See evaluation for individualized goals.  Description: FUNCTIONAL STATUS PRIOR TO ADMISSION:  Patient was independent with ADLs and functional mobility/ ambulatory with a rollator. Doing dishes, laundry, and dog care. Also driving.  No recent falls.      HOME SUPPORT: Patient resided at home with her daughter and son-in-law and did not require assistance.    Occupational Therapy Goals:  Initiated 8/26/2024  1.  Patient will perform bathing with Minimal Assist within 7 day(s).  2.  Patient will perform lower body dressing with Minimal Assist using AE within 7 day(s).  3.  Patient will perform grooming standing at sink with Contact Guard Assist within 7 day(s).  4.  Patient will perform toilet transfers with Minimal Assist  within 7 day(s).  5.  Patient will perform all aspects of toileting with Minimal Assist within 7 day(s).  6.  Patient will participate in upper extremity therapeutic exercise/activities with Supervision for 10 minutes within 7 day(s).    7.  Patient will utilize fall prevention techniques during functional activities with verbal cues within 7 day(s).    Outcome: Progressing     OCCUPATIONAL THERAPY TREATMENT  Patient: Olimpia Hernandez (79 y.o. female)  Date: 8/27/2024  Primary Diagnosis: Hypotension [I95.9]  Septic shock (HCC) [A41.9, R65.21]       Precautions: General Precautions                Chart, occupational therapy assessment, plan of care, and goals were reviewed.    ASSESSMENT  Patient is progressing well in OT goals but remains limited by lower left leg pain at wound, impaired balance, tachycardia with activity (up to 140s), moderate CAMPOS (although maintained SPO2 >95% on room air), and impaired LB reach for ADLS (reports difficulty at baseline now exacerbated by wound).  She progressed to perform anterior toileting hygiene/ proximal LB bathing

## 2024-08-27 NOTE — PLAN OF CARE
Problem: Discharge Planning  Goal: Discharge to home or other facility with appropriate resources  8/27/2024 0940 by Andrea Harry RN  Outcome: Progressing  8/26/2024 2322 by Claudia Clinton LPN  Outcome: Progressing     Problem: Safety - Adult  Goal: Free from fall injury  8/27/2024 0940 by Andrea Harry RN  Outcome: Progressing  8/26/2024 2322 by Claudia Clinton LPN  Outcome: Progressing     Problem: ABCDS Injury Assessment  Goal: Absence of physical injury  8/27/2024 0940 by Andrea Harry RN  Outcome: Progressing  8/26/2024 2322 by Claudia Clinton LPN  Outcome: Progressing     Problem: Pain  Goal: Verbalizes/displays adequate comfort level or baseline comfort level  8/27/2024 0940 by Andrea Harry RN  Outcome: Progressing  8/26/2024 2322 by Claudia Clinton LPN  Outcome: Progressing     Problem: Skin/Tissue Integrity  Goal: Absence of new skin breakdown  Description: 1.  Monitor for areas of redness and/or skin breakdown  2.  Assess vascular access sites hourly  3.  Every 4-6 hours minimum:  Change oxygen saturation probe site  4.  Every 4-6 hours:  If on nasal continuous positive airway pressure, respiratory therapy assess nares and determine need for appliance change or resting period.  8/27/2024 0940 by Andrea Harry RN  Outcome: Progressing  8/26/2024 2322 by Claudia Clinton LPN  Outcome: Progressing     Problem: Respiratory - Adult  Goal: Achieves optimal ventilation and oxygenation  8/27/2024 0940 by Andrea Harry RN  Outcome: Progressing  8/26/2024 2322 by Claudia Clinton LPN  Outcome: Progressing     Problem: Physical Therapy - Adult  Goal: By Discharge: Performs mobility at highest level of function for planned discharge setting.  See evaluation for individualized goals.  Description: FUNCTIONAL STATUS PRIOR TO ADMISSION: Patient was modified independent using a rollator for functional mobility.    HOME SUPPORT PRIOR TO ADMISSION: The patient lived with daughter and  son-in-law in 1 level home with ramp to enter, she  did not require assistance for ADL's.    Physical Therapy Goals  Initiated 8/26/2024  1.  Patient will move from supine to sit and sit to supine in bed with minimal assistance within 7 day(s).    2.  Patient will perform sit to stand with contact guard assist within 7 day(s).  3.  Patient will transfer from bed to chair and chair to bed with contact guard assist using the least restrictive device within 7 day(s).  4.  Patient will ambulate with contact guard assist for 50 feet with the least restrictive device within 7 day(s).   8/27/2024 0828 by Rowena Mcrae, PT  Outcome: Progressing

## 2024-08-27 NOTE — PROGRESS NOTES
[facility] Pharmacy Dosing Services: 08/27/24   Consult for Warfarin Dosing by Pharmacy by Dr. John  Consult provided for this 79 y.o. female , for indication of Afib/flutter  Day of Therapy: continued from home  According to the patient, 3 mg on MWF,  2 mg on TT, no warfarin on weekends    Dose to achieve an INR goal of 2-3    A/Plan:  Warfarin 2.5 mg ordered @ 1800  Current INR = 1.1, s/p Vit K  MD Parker confirmed patient to resume warfarin dosing starting today. No dose given yesterday 8/26 PM (held per MD Parker)    Significant drug interactions, such as enoxaparin: n/a  PT/INR Lab Results   Component Value Date/Time    INR 1.1 08/27/2024 11:00 AM      Platelets Lab Results   Component Value Date/Time     08/27/2024 01:57 AM      H/H Lab Results   Component Value Date/Time    HGB 7.3 08/27/2024 01:57 AM        Pharmacy to follow daily and will provide subsequent Warfarin dosing based on clinical status.  Naresh Mckeon [facility]) Contact information [phone]

## 2024-08-27 NOTE — DISCHARGE INSTRUCTIONS
HOSPITALIST DISCHARGE INSTRUCTIONS  NAME:  Olimpia Hernandez   :  1945   MRN:  [id number]     Date/Time:  2024 10:36 AM    ADMIT DATE: 2024     DISCHARGE DATE: 2024     DISCHARGE DIAGNOSIS:  Hypotension     DISCHARGE INSTRUCTIONS:  Thank you for allowing us to participate in your care. Your discharging Hospitalist is Mau aPrker MD. You were admitted for evaluation and treatment of the following:    Hypotension - This was related to medications, dehydration and anemia.  We added midodrine. We stopped lasix and lisinopril.     Chronic kidney disease, stage III - This was worse due to dehydration and medications. You have moderate protein in urine.  Your doctor needs to follow your kidney function      Anemia / Thrombocytopenia / Hematoma - You had a large hematoma in your leg.  It is now stable, but will take weeks to improve     Chronic atrial fibrillation / Chronic anticoagulation - We resumed coreg to control your pulse.  We resumed coumadin now that your bleeding has stopped.  Stop taking lisinopril and Lasix.     Neuropathy / Chronic pain - Continue tylenol, gabapentin and Ultram.  Our PT OT evaluation recommend rehab, and we are arranging that.      Lymphedema - Stop Lasix for this issue, due to hypotension.  Leg elevation, SHENA hose and fluid restriction are better.  Follow up in lymphedema clinic.     Hyperlipidemia - You have low cholesterol. Stop taking statin      MEDICATIONS:    It is important that you take the medication exactly as they are prescribed.   Keep your medication in the bottles provided by the pharmacist and keep a list of the medication names, dosages, and times to be taken in your wallet.   Do not take other medications without consulting your doctor.       If you experience any of the following symptoms then please call your primary care physician or return to the emergency room if you cannot get hold of your doctor:  Fever, chills, nausea, vomiting, diarrhea,  change in mentation, falling, bleeding, shortness of breath    Follow Up:  Please call the below provider to arrange hospital follow up appointment      [facility]  [id number] [facility] [address]  [facility] [address] [phone]  Schedule an appointment as soon as possible for a visit in 1 week(s)      Rosana Kerr MD  [address] [facility] [address] [phone]    Schedule an appointment as soon as possible for a visit in 1 week(s)        For questions regarding your Hospitalization or to contact the Hospital Medicine team, please call [phone].      Information obtained by :  I understand that if any problems occur once I am at home I am to contact my physician.    I understand and acknowledge receipt of the instructions indicated above.                                                                                                                                           Physician's or R.N.'s Signature                                                                  Date/Time                                                                                                                                              Patient or Representative Signature                                                          Date/Time

## 2024-08-27 NOTE — PLAN OF CARE
Problem: Discharge Planning  Goal: Discharge to home or other facility with appropriate resources  8/26/2024 2322 by Claudia Clinton LPN  Outcome: Progressing  8/26/2024 1804 by Andrea Harry RN  Outcome: Progressing     Problem: Safety - Adult  Goal: Free from fall injury  8/26/2024 2322 by Claudia Clinton LPN  Outcome: Progressing  8/26/2024 1804 by Andrea Harry RN  Outcome: Progressing     Problem: ABCDS Injury Assessment  Goal: Absence of physical injury  8/26/2024 2322 by Claudia Clinton LPN  Outcome: Progressing  8/26/2024 1804 by Andrea Harry RN  Outcome: Progressing     Problem: Pain  Goal: Verbalizes/displays adequate comfort level or baseline comfort level  8/26/2024 2322 by Claudia Clinton LPN  Outcome: Progressing  8/26/2024 1804 by Andrea Harry RN  Outcome: Progressing     Problem: Skin/Tissue Integrity  Goal: Absence of new skin breakdown  Description: 1.  Monitor for areas of redness and/or skin breakdown  2.  Assess vascular access sites hourly  3.  Every 4-6 hours minimum:  Change oxygen saturation probe site  4.  Every 4-6 hours:  If on nasal continuous positive airway pressure, respiratory therapy assess nares and determine need for appliance change or resting period.  8/26/2024 2322 by Claudia Clinton LPN  Outcome: Progressing  8/26/2024 1804 by Andrea Harry RN  Outcome: Progressing     Problem: Respiratory - Adult  Goal: Achieves optimal ventilation and oxygenation  8/26/2024 2322 by Claudia Clinton LPN  Outcome: Progressing  8/26/2024 1804 by Andrea Harry RN  Outcome: Progressing     Problem: Occupational Therapy - Adult  Goal: By Discharge: Performs self-care activities at highest level of function for planned discharge setting.  See evaluation for individualized goals.  Description: FUNCTIONAL STATUS PRIOR TO ADMISSION:  Patient was independent with ADLs and functional mobility/ ambulatory with a rollator. Doing dishes, laundry, and dog care. Also driving.

## 2024-08-27 NOTE — DISCHARGE SUMMARY
Physician Discharge Summary     Patient ID:  Olimpia Hernandez  [id number]  79 y.o.  1945    Admit date: 8/24/2024    Discharge date of service and time: 8/30/2024  Greater than 30 minutes were spent providing discharge related services for this patient    Admission Diagnoses: Hypotension [I95.9]  Septic shock (HCC) [A41.9, R65.21]    Discharge Diagnoses:    Principal Diagnosis   Hypotension                                             Hospital Course and other diagnoses  Hypovolemic shock / Hypotension - POA due to IVVD and anemia.  BP better after hydration, but not great. Added midodrine, increase dose. Normal ECHO.  Levophed stopped.  Stopped lasix, lisinopril and coreg.     Acute kidney injury / Dehydration / Hypoalbuminemia / Chronic kidney disease, stage III - POA, due to poor PO intake.  Hydrated and improved. Normal US. Moderate protein in urine.  Likely has CKD3.  Nutrition support.      Overdose of coumadin / Chronic anticoagulation - POA, unclear why INR spiked.  Held coumadin, can resume today. Gave Vitamin K.  Outpatient cardiology to consider Watchman etc.     Anemia / Thrombocytopenia / Hematoma - POA, CT of leg read as hematoma vs abscess. No other sign of infection.  Procalcitonin undetectable.  No SIRS.  Bleeding stopped.  Hb stable.  No Abx     Chronic atrial fibrillation / Hypertension - POA, pulse rate a bit high due to JESSI.  Reduced coreg to avoid low BP.  Resume coumadin.  Stopping lisinopril and Lasix.     Neuropathy / Chronic pain - Continue gabapentin and Ultram.  These contribute to low BP and fall risk. PT OT evaluation recommend SNF and we are arranging that. Scheduled tylenol.     Lymphedema - Stop Lasix for this issue, due to hypotension.  Elevation, SHENA hose and fluid restriction are needed.  Possible follow up in lymphedema clinic.     Hyperlipidemia - LDL 42 on panel.  No hx of CAD nor CVA to demand tight control. Stop statin     PCP: No primary care provider on file.    Consults:

## 2024-08-28 LAB
COMMENT:: NORMAL
INR PPP: 1.1 (ref 0.9–1.1)
PROTHROMBIN TIME: 11.8 SEC (ref 9–11.1)
SPECIMEN HOLD: NORMAL

## 2024-08-28 PROCEDURE — 2580000003 HC RX 258: Performed by: STUDENT IN AN ORGANIZED HEALTH CARE EDUCATION/TRAINING PROGRAM

## 2024-08-28 PROCEDURE — 97530 THERAPEUTIC ACTIVITIES: CPT

## 2024-08-28 PROCEDURE — 6370000000 HC RX 637 (ALT 250 FOR IP): Performed by: INTERNAL MEDICINE

## 2024-08-28 PROCEDURE — 94761 N-INVAS EAR/PLS OXIMETRY MLT: CPT

## 2024-08-28 PROCEDURE — 6370000000 HC RX 637 (ALT 250 FOR IP): Performed by: STUDENT IN AN ORGANIZED HEALTH CARE EDUCATION/TRAINING PROGRAM

## 2024-08-28 PROCEDURE — 97116 GAIT TRAINING THERAPY: CPT

## 2024-08-28 PROCEDURE — 1100000000 HC RM PRIVATE

## 2024-08-28 PROCEDURE — 85610 PROTHROMBIN TIME: CPT

## 2024-08-28 RX ORDER — WARFARIN SODIUM 1 MG/1
3 TABLET ORAL
Status: COMPLETED | OUTPATIENT
Start: 2024-08-28 | End: 2024-08-28

## 2024-08-28 RX ORDER — CARVEDILOL 12.5 MG/1
12.5 TABLET ORAL 2 TIMES DAILY WITH MEALS
Status: DISCONTINUED | OUTPATIENT
Start: 2024-08-28 | End: 2024-08-29

## 2024-08-28 RX ADMIN — POLYETHYLENE GLYCOL 3350 17 G: 17 POWDER, FOR SOLUTION ORAL at 20:46

## 2024-08-28 RX ADMIN — GABAPENTIN 100 MG: 100 CAPSULE ORAL at 20:46

## 2024-08-28 RX ADMIN — WARFARIN SODIUM 3 MG: 1 TABLET ORAL at 17:26

## 2024-08-28 RX ADMIN — CARVEDILOL 12.5 MG: 12.5 TABLET, FILM COATED ORAL at 17:26

## 2024-08-28 RX ADMIN — CARVEDILOL 6.25 MG: 6.25 TABLET, FILM COATED ORAL at 09:18

## 2024-08-28 RX ADMIN — ONDANSETRON 4 MG: 4 TABLET, ORALLY DISINTEGRATING ORAL at 11:27

## 2024-08-28 RX ADMIN — GABAPENTIN 100 MG: 100 CAPSULE ORAL at 14:06

## 2024-08-28 RX ADMIN — OXYCODONE 5 MG: 5 TABLET ORAL at 15:42

## 2024-08-28 RX ADMIN — TRAMADOL HYDROCHLORIDE 50 MG: 50 TABLET ORAL at 17:55

## 2024-08-28 RX ADMIN — POLYETHYLENE GLYCOL 3350 17 G: 17 POWDER, FOR SOLUTION ORAL at 09:18

## 2024-08-28 RX ADMIN — MIDODRINE HYDROCHLORIDE 2.5 MG: 5 TABLET ORAL at 17:26

## 2024-08-28 RX ADMIN — TRAMADOL HYDROCHLORIDE 50 MG: 50 TABLET ORAL at 11:26

## 2024-08-28 RX ADMIN — SODIUM CHLORIDE, PRESERVATIVE FREE 10 ML: 5 INJECTION INTRAVENOUS at 09:18

## 2024-08-28 RX ADMIN — GABAPENTIN 100 MG: 100 CAPSULE ORAL at 09:18

## 2024-08-28 RX ADMIN — MIDODRINE HYDROCHLORIDE 2.5 MG: 5 TABLET ORAL at 09:18

## 2024-08-28 RX ADMIN — MIDODRINE HYDROCHLORIDE 2.5 MG: 5 TABLET ORAL at 12:32

## 2024-08-28 RX ADMIN — SODIUM CHLORIDE, PRESERVATIVE FREE 10 ML: 5 INJECTION INTRAVENOUS at 20:46

## 2024-08-28 ASSESSMENT — PAIN SCALES - GENERAL
PAINLEVEL_OUTOF10: 4
PAINLEVEL_OUTOF10: 4
PAINLEVEL_OUTOF10: 3
PAINLEVEL_OUTOF10: 4
PAINLEVEL_OUTOF10: 6
PAINLEVEL_OUTOF10: 6
PAINLEVEL_OUTOF10: 3
PAINLEVEL_OUTOF10: 6

## 2024-08-28 ASSESSMENT — PAIN DESCRIPTION - LOCATION
LOCATION: LEG

## 2024-08-28 ASSESSMENT — PAIN DESCRIPTION - ORIENTATION
ORIENTATION: RIGHT;LEFT
ORIENTATION: LEFT

## 2024-08-28 ASSESSMENT — PAIN DESCRIPTION - DESCRIPTORS
DESCRIPTORS: ACHING

## 2024-08-28 NOTE — PROGRESS NOTES
[facility]    Hospitalist Progress Note    NAME: Olimpia Hernandez   :  1945  MRM:  [id number]    Date/Time of service 2024  9:56 AM    To assist coordination of care and communication with nursing and staff, this note may be preliminary early in the day, but finalized by end of the day.        Assessment and Plan:     Hypovolemic shock / Hypotension - POA due to IVVD and anemia.  BP better after hydration, but not great. Added midodrine. Normal ECHO.  Levophed stopped.  Stopped lasix, lisinopril.    Acute kidney injury / Dehydration / Hypoalbuminemia / Chronic kidney disease, stage III - POA, due to poor PO intake.  Hydrated and improved. Normal US. Moderate protein in urine.  Likely has CKD3.  Nutrition support.      Overdose of coumadin / Chronic anticoagulation - POA, unclear why INR spiked.  Held coumadin, can resume today. Gave Vitamin K.  Outpatient cardiology to consider Watchman etc.    Anemia / Thrombocytopenia / Hematoma - POA, CT of leg read as hematoma vs abscess. No other sign of infection.  Procalcitonin undetectable.  No SIRS.  Bleeding stopped.  Hb stable.  No Abx    Chronic atrial fibrillation / Hypertension - POA, pulse rate a bit high due to JESSI.  Resumed coreg to avoid rebound.  Resume coumadin.  Stopping lisinopril and Lasix.    Neuropathy / Chronic pain - Continue gabapentin and Ultram.  These contribute to low BP and fall risk. PT OT evaluation recommend SNF and we are arranging that. Scheduled tylenol.    Lymphedema - Stop Lasix for this issue, due to hypotension.  Elevation, SHENA hose and fluid restriction are needed.  Possible follow up in lymphedema clinic.    Hyperlipidemia - LDL 42 on panel.  No hx of CAD nor CVA to demand tight control. Stop statin       Subjective:     Chief Complaint:  Improved left foot and leg pain    ROS:  (bold if positive, if negative)    Tolerating some PT  Tolerating Diet        Objective:     Last 24hrs VS reviewed since prior  [[id number]] Collected: 08/25/24 0753    Order Status: Completed Specimen: Nasopharyngeal Updated: 08/25/24 0845     Source Nasopharyngeal        SARS-CoV-2, PCR Not detected        Comment: Not Detected results do not preclude SARS-CoV-2 infection and should not be used as the sole basis for patient management decisions.Results must be combined with clinical observations, patient history, and epidemiological information.        Rapid Influenza A By PCR Not detected        Rapid Influenza B By PCR Not detected        Comment: Testing was performed using chandrika Allyson SARS-CoV-2 and Influenza A/B nucleic acid assay.  This test is a multiplex Real-Time Reverse Transcriptase Polymerase Chain Reaction (RT-PCR) based in vitro diagnostic test intended for the qualitative detection of nucleic acids from SARS-CoV-2, Influenza A, and Influenza B in nasopharyngeal and nasal swab specimens for use under the FDA's Emergency Use Authorization (EUA) only.     Fact sheet for Patients: https://www.fda.gov/media/981100/download  Fact sheet for Healthcare Providers: https://www.fda.fov/media/648348/download         Culture, Blood 1 [[id number]] Collected: 08/24/24 1959    Order Status: Completed Specimen: Blood Updated: 08/28/24 0457     Special Requests --        NO SPECIAL REQUESTS  LEFT  Antecubital       Culture NO GROWTH 4 DAYS       Culture, Blood 2 [[id number]] Collected: 08/24/24 1959    Order Status: Completed Specimen: Blood Updated: 08/28/24 0457     Special Requests --        NO SPECIAL REQUESTS  RIGHT  Antecubital       Culture NO GROWTH 4 DAYS       Urine Culture Hold Sample [[id number]] Collected: 08/24/24 1915    Order Status: Canceled Specimen: Urine             Other pertinent lab: none    Total time spent with patient: 30 Minutes I personally reviewed chart, notes, data and current medications in the medical record.  I have personally examined and treated the patient at bedside during this period.

## 2024-08-28 NOTE — PLAN OF CARE
Problem: Discharge Planning  Goal: Discharge to home or other facility with appropriate resources  Outcome: Progressing     Problem: Safety - Adult  Goal: Free from fall injury  Outcome: Progressing     Problem: ABCDS Injury Assessment  Goal: Absence of physical injury  Outcome: Progressing     Problem: Pain  Goal: Verbalizes/displays adequate comfort level or baseline comfort level  Outcome: Progressing     Problem: Skin/Tissue Integrity  Goal: Absence of new skin breakdown  Description: 1.  Monitor for areas of redness and/or skin breakdown  2.  Assess vascular access sites hourly  3.  Every 4-6 hours minimum:  Change oxygen saturation probe site  4.  Every 4-6 hours:  If on nasal continuous positive airway pressure, respiratory therapy assess nares and determine need for appliance change or resting period.  Outcome: Progressing     Problem: Respiratory - Adult  Goal: Achieves optimal ventilation and oxygenation  Outcome: Progressing     Problem: Occupational Therapy - Adult  Goal: By Discharge: Performs self-care activities at highest level of function for planned discharge setting.  See evaluation for individualized goals.  Description: FUNCTIONAL STATUS PRIOR TO ADMISSION:  Patient was independent with ADLs and functional mobility/ ambulatory with a rollator. Doing dishes, laundry, and dog care. Also driving.  No recent falls.      HOME SUPPORT: Patient resided at home with her daughter and son-in-law and did not require assistance.    Occupational Therapy Goals:  Initiated 8/26/2024  1.  Patient will perform bathing with Minimal Assist within 7 day(s).  2.  Patient will perform lower body dressing with Minimal Assist using AE within 7 day(s).  3.  Patient will perform grooming standing at sink with Contact Guard Assist within 7 day(s).  4.  Patient will perform toilet transfers with Minimal Assist  within 7 day(s).  5.  Patient will perform all aspects of toileting with Minimal Assist within 7 day(s).  6.

## 2024-08-28 NOTE — PROGRESS NOTES
Occupational Therapy: defer    Attempted OT treatment.  Note she stood and side-stepped in PT earlier.  Patient was received resting in bed and declined additional activity at this time, citing increased lower LLE pain, which she attributed possibly due to her wrap being too tight.  RN notified.  Will follow-up as able and appropriate for OT.    Noah Cook, OTR/L

## 2024-08-28 NOTE — PROGRESS NOTES
[facility] Pharmacy Dosing Services: 08/28/24  Consult for Warfarin Dosing by Pharmacy by Dr. Parker  Consult provided for this 78 yo female for indication of Afib  Day of Therapy: resumed from home. (PTA: According to the patient, 3 mg on MWF, 2 mg on TT, no warfarin on weekends )  Dose to achieve an INR goal of 2-3    Order entered for  Warfarin  3 (mg) ordered to be given today at 18:00.     Significant drug interactions: Vitamin K given on 8/24 and 8/25  Previous dose    PT/INR Lab Results   Component Value Date/Time    INR 1.1 08/28/2024 04:19 AM      Platelets Lab Results   Component Value Date/Time     08/27/2024 01:57 AM      H/H Lab Results   Component Value Date/Time    HGB 7.3 08/27/2024 01:57 AM        Pharmacy to follow daily and will provide subsequent Warfarin dosing based on clinical status.  J[facility] SER[facility])   Contact information [phone]

## 2024-08-28 NOTE — PLAN OF CARE
Problem: Discharge Planning  Goal: Discharge to home or other facility with appropriate resources  8/28/2024 1044 by Brinda Hi RN  Outcome: Progressing  8/28/2024 0143 by Claudia Clinton LPN  Outcome: Progressing     Problem: Safety - Adult  Goal: Free from fall injury  8/28/2024 1044 by Brinda Hi RN  Outcome: Progressing  8/28/2024 0143 by Claudia Clinton LPN  Outcome: Progressing     Problem: ABCDS Injury Assessment  Goal: Absence of physical injury  8/28/2024 1044 by Brinda Hi RN  Outcome: Progressing  8/28/2024 0143 by Claudia Clinton LPN  Outcome: Progressing     Problem: Pain  Goal: Verbalizes/displays adequate comfort level or baseline comfort level  8/28/2024 1044 by Brinda Hi RN  Outcome: Progressing  8/28/2024 0143 by Claudia Clinton LPN  Outcome: Progressing     Problem: Skin/Tissue Integrity  Goal: Absence of new skin breakdown  Description: 1.  Monitor for areas of redness and/or skin breakdown  2.  Assess vascular access sites hourly  3.  Every 4-6 hours minimum:  Change oxygen saturation probe site  4.  Every 4-6 hours:  If on nasal continuous positive airway pressure, respiratory therapy assess nares and determine need for appliance change or resting period.  8/28/2024 1044 by Brinda Hi RN  Outcome: Progressing  8/28/2024 0143 by Claudia Clinton LPN  Outcome: Progressing     Problem: Respiratory - Adult  Goal: Achieves optimal ventilation and oxygenation  8/28/2024 1044 by Brinda Hi RN  Outcome: Progressing  8/28/2024 0143 by Claudia Clinton LPN  Outcome: Progressing

## 2024-08-28 NOTE — CARE COORDINATION
Case Management Progress Note    Discharge Plan:  Anticipate DC to [facility]--Accepted.  Pending SNF AUTH ([employer]).  Auth started 8/27.      CM called facility and confirmed with Admissions that at this time, SNF auth is still pending.    Will continue to follow.      ______________________________________  NICHOLE Lynn, RN-CM  [facility]- Care Management  Available via [employer]  8/28/2024  4:44 PM

## 2024-08-28 NOTE — PLAN OF CARE
recommended toilet device: a bedside commode with staff assist x1 using  gait belt and rolling walker.    Recommend for next PT session: further progression of gait with existing device    Recommendation for discharge: (in order for the patient to meet his/her long term goals):   High intensity/comprehensive skilled physical therapy in a multidisciplinary setting as patient is working towards tolerating up to 3 hours of therapy/day 5-7x/week    Other factors to consider for discharge: patient's current support system is unable to meet their requirements for physical assistance, high risk for falls, and concern for safely navigating or managing the home environment    IF patient discharges home will need the following DME: continuing to assess with progress       SUBJECTIVE:   Patient stated, \"I am sorry I could do more.\"    OBJECTIVE DATA SUMMARY:   Critical Behavior:          Functional Mobility Training:  Bed Mobility:  Bed Mobility Training  Supine to Sit: Moderate assistance  Sit to Supine: Maximum assistance;Assist X1  Scooting: Moderate assistance;Assist X1;Additional time  Transfers:  Transfer Training  Sit to Stand: Moderate assistance;Assist X1;Additional time  Stand to Sit: Moderate assistance;Assist X1;Additional time  Balance:  Balance  Sitting: Impaired  Sitting - Static: Good (unsupported)  Sitting - Dynamic: Good (unsupported);Fair (occasional)  Standing: Impaired;With support  Standing - Static: Constant support;Fair  Standing - Dynamic: Constant support;Fair   Ambulation/Gait Training:     Gait  Distance (ft): 3 Feet  Assistive Device: Gait belt;Walker, rolling  Interventions: Safety awareness training;Weight shifting training/pressure relief;Verbal cues  Base of Support: Widened  Speed/Alondra: Slow;Shuffled  Gait Abnormalities: Antalgic;Step to gait        Neuro Re-Education:                    Pain Ratin/10   Pain Intervention(s):   patient medicated for pain prior to session, rest,  elevation, and repositioning    Activity Tolerance:   Fair     After treatment:   Patient left in no apparent distress in bed, Call bell within reach, and Bed/ chair alarm activated      COMMUNICATION/EDUCATION:   The patient's plan of care was discussed with: registered nurse    Patient Education  Education Given To: Patient  Education Provided: Role of Therapy;Plan of Care;Fall Prevention Strategies;Equipment;Transfer Training  Education Method: Verbal  Barriers to Learning: None  Education Outcome: Verbalized understanding;Continued education needed;Demonstrated understanding      Fariba Gonzalez, PTA  Minutes: 23

## 2024-08-29 LAB
INR PPP: 1.3 (ref 0.9–1.1)
PROTHROMBIN TIME: 13 SEC (ref 9–11.1)

## 2024-08-29 PROCEDURE — 6370000000 HC RX 637 (ALT 250 FOR IP): Performed by: INTERNAL MEDICINE

## 2024-08-29 PROCEDURE — 2580000003 HC RX 258: Performed by: STUDENT IN AN ORGANIZED HEALTH CARE EDUCATION/TRAINING PROGRAM

## 2024-08-29 PROCEDURE — 85610 PROTHROMBIN TIME: CPT

## 2024-08-29 PROCEDURE — 94761 N-INVAS EAR/PLS OXIMETRY MLT: CPT

## 2024-08-29 PROCEDURE — 1100000000 HC RM PRIVATE

## 2024-08-29 PROCEDURE — 6370000000 HC RX 637 (ALT 250 FOR IP): Performed by: STUDENT IN AN ORGANIZED HEALTH CARE EDUCATION/TRAINING PROGRAM

## 2024-08-29 PROCEDURE — 97530 THERAPEUTIC ACTIVITIES: CPT

## 2024-08-29 PROCEDURE — 36415 COLL VENOUS BLD VENIPUNCTURE: CPT

## 2024-08-29 PROCEDURE — 97116 GAIT TRAINING THERAPY: CPT

## 2024-08-29 PROCEDURE — 97535 SELF CARE MNGMENT TRAINING: CPT

## 2024-08-29 RX ORDER — MIDODRINE HYDROCHLORIDE 5 MG/1
5 TABLET ORAL ONCE
Status: COMPLETED | OUTPATIENT
Start: 2024-08-29 | End: 2024-08-29

## 2024-08-29 RX ORDER — MIDODRINE HYDROCHLORIDE 5 MG/1
5 TABLET ORAL
Status: DISCONTINUED | OUTPATIENT
Start: 2024-08-29 | End: 2024-08-30 | Stop reason: HOSPADM

## 2024-08-29 RX ORDER — CARVEDILOL 3.12 MG/1
3.12 TABLET ORAL 2 TIMES DAILY WITH MEALS
Status: DISCONTINUED | OUTPATIENT
Start: 2024-08-29 | End: 2024-08-30

## 2024-08-29 RX ORDER — TRAMADOL HYDROCHLORIDE 50 MG/1
50 TABLET ORAL 2 TIMES DAILY PRN
Qty: 10 TABLET | Refills: 0 | Status: SHIPPED | OUTPATIENT
Start: 2024-08-29 | End: 2024-09-03

## 2024-08-29 RX ORDER — CARVEDILOL 3.12 MG/1
3.12 TABLET ORAL 2 TIMES DAILY WITH MEALS
Qty: 60 TABLET | Refills: 3 | Status: SHIPPED
Start: 2024-08-29 | End: 2024-08-30 | Stop reason: HOSPADM

## 2024-08-29 RX ORDER — GABAPENTIN 100 MG/1
100 CAPSULE ORAL 3 TIMES DAILY
Qty: 30 CAPSULE | Refills: 0 | Status: SHIPPED | OUTPATIENT
Start: 2024-08-29 | End: 2024-09-08

## 2024-08-29 RX ORDER — WARFARIN SODIUM 1 MG/1
3 TABLET ORAL
Status: COMPLETED | OUTPATIENT
Start: 2024-08-29 | End: 2024-08-29

## 2024-08-29 RX ORDER — MIDODRINE HYDROCHLORIDE 5 MG/1
5 TABLET ORAL
Qty: 90 TABLET | Refills: 3 | Status: SHIPPED | OUTPATIENT
Start: 2024-08-29

## 2024-08-29 RX ADMIN — MIDODRINE HYDROCHLORIDE 5 MG: 5 TABLET ORAL at 09:08

## 2024-08-29 RX ADMIN — SODIUM CHLORIDE, PRESERVATIVE FREE 10 ML: 5 INJECTION INTRAVENOUS at 22:10

## 2024-08-29 RX ADMIN — MIDODRINE HYDROCHLORIDE 5 MG: 5 TABLET ORAL at 11:56

## 2024-08-29 RX ADMIN — WARFARIN SODIUM 3 MG: 1 TABLET ORAL at 18:02

## 2024-08-29 RX ADMIN — MIDODRINE HYDROCHLORIDE 5 MG: 5 TABLET ORAL at 18:02

## 2024-08-29 RX ADMIN — GABAPENTIN 100 MG: 100 CAPSULE ORAL at 22:10

## 2024-08-29 RX ADMIN — SODIUM CHLORIDE, PRESERVATIVE FREE 10 ML: 5 INJECTION INTRAVENOUS at 09:09

## 2024-08-29 RX ADMIN — TRAMADOL HYDROCHLORIDE 50 MG: 50 TABLET ORAL at 12:35

## 2024-08-29 RX ADMIN — OXYCODONE 5 MG: 5 TABLET ORAL at 07:48

## 2024-08-29 RX ADMIN — GABAPENTIN 100 MG: 100 CAPSULE ORAL at 13:52

## 2024-08-29 RX ADMIN — POLYETHYLENE GLYCOL 3350 17 G: 17 POWDER, FOR SOLUTION ORAL at 09:08

## 2024-08-29 RX ADMIN — GABAPENTIN 100 MG: 100 CAPSULE ORAL at 09:08

## 2024-08-29 ASSESSMENT — PAIN DESCRIPTION - LOCATION
LOCATION: LEG

## 2024-08-29 ASSESSMENT — PAIN SCALES - GENERAL
PAINLEVEL_OUTOF10: 6
PAINLEVEL_OUTOF10: 6
PAINLEVEL_OUTOF10: 7
PAINLEVEL_OUTOF10: 3

## 2024-08-29 ASSESSMENT — PAIN DESCRIPTION - DESCRIPTORS
DESCRIPTORS: ACHING

## 2024-08-29 ASSESSMENT — PAIN DESCRIPTION - ORIENTATION
ORIENTATION: LEFT
ORIENTATION: LEFT;LOWER
ORIENTATION: LEFT

## 2024-08-29 NOTE — CARE COORDINATION
8/29/2024 1:14 PM   CM called to pt's [employer]([phone]) to check pt's authorization for SNF with [facility]. CM spoke with Nadine who reported pt's auth for [facility] is pending, confirmed they have needed clinicals for review. Nadine reported request was received at 4PM on 8/28/24 and anticipating an answer today.  Will follow.   OLGA Armstrong

## 2024-08-29 NOTE — PLAN OF CARE
Problem: Discharge Planning  Goal: Discharge to home or other facility with appropriate resources  Outcome: [facility] Progressing  Flowsheets (Taken 8/28/2024 2020 by Bhavna Ventura, RN)  Discharge to home or other facility with appropriate resources:   Identify barriers to discharge with patient and caregiver   Arrange for needed discharge resources and transportation as appropriate   Identify discharge learning needs (meds, wound care, etc)     Problem: Safety - Adult  Goal: Free from fall injury  8/29/2024 0922 by Nicolasa Marie RN  Outcome: [facility] Progressing  8/28/2024 2351 by Bhavna Ventura RN  Outcome: Progressing     Problem: ABCDS Injury Assessment  Goal: Absence of physical injury  Outcome: [facility] Progressing     Problem: Pain  Goal: Verbalizes/displays adequate comfort level or baseline comfort level  8/29/2024 0922 by Nicolasa Marie RN  Outcome: [facility] Progressing  8/28/2024 2351 by Bhavna Ventura RN  Outcome: Progressing  Flowsheets (Taken 8/28/2024 1940)  Verbalizes/displays adequate comfort level or baseline comfort level:   Encourage patient to monitor pain and request assistance   Assess pain using appropriate pain scale   Administer analgesics based on type and severity of pain and evaluate response   Implement non-pharmacological measures as appropriate and evaluate response     Problem: Skin/Tissue Integrity  Goal: Absence of new skin breakdown  Description: 1.  Monitor for areas of redness and/or skin breakdown  2.  Assess vascular access sites hourly  3.  Every 4-6 hours minimum:  Change oxygen saturation probe site  4.  Every 4-6 hours:  If on nasal continuous positive airway pressure, respiratory therapy assess nares and determine need for appliance change or resting period.  8/29/2024 0922 by Nicolasa Marie RN  Outcome: [facility] Progressing  8/28/2024 2351 by Bhavna Ventura RN  Outcome: Progressing     Problem: Respiratory - Adult  Goal: Achieves optimal ventilation and

## 2024-08-29 NOTE — PLAN OF CARE
Problem: Physical Therapy - Adult  Goal: By Discharge: Performs mobility at highest level of function for planned discharge setting.  See evaluation for individualized goals.  Description: FUNCTIONAL STATUS PRIOR TO ADMISSION: Patient was modified independent using a rollator for functional mobility.    HOME SUPPORT PRIOR TO ADMISSION: The patient lived with daughter and son-in-law in 1 level home with ramp to enter, she  did not require assistance for ADL's.    Physical Therapy Goals  Initiated 8/26/2024  1.  Patient will move from supine to sit and sit to supine in bed with minimal assistance within 7 day(s).    2.  Patient will perform sit to stand with contact guard assist within 7 day(s).  3.  Patient will transfer from bed to chair and chair to bed with contact guard assist using the least restrictive device within 7 day(s).  4.  Patient will ambulate with contact guard assist for 50 feet with the least restrictive device within 7 day(s).   Outcome: Progressing   PHYSICAL THERAPY TREATMENT    Patient: Olimpia Hernandez (79 y.o. female)  Date: 8/29/2024  Diagnosis: Hypotension [I95.9]  Septic shock (HCC) [A41.9, R65.21] Hypotension      Precautions: General Precautions                      ASSESSMENT:  Patient continues to benefit from skilled PT services and is slowly progressing towards goals. Pt reports improved BLE pain today. Pt requires increased time, cues for sequencing Mod/Max A for bed mobility. Demonstrates very slow transfer to chair with RW support, no buckling, tolerated WB on LLE today. Pt Easily distracted- occasional cues to stay on task. Denies dizziness, lightheadedness, SOB with activity, pt remained in chair set up for lunch meal. Daughter at bedside. Recommend nursing staff use NATASHA MILES for return to bed.          PLAN:  Patient continues to benefit from skilled intervention to address the above impairments.  Continue treatment per established plan of care.    Recommend with staff: therapy  recommendations for staff: Recommend toileting using  recommended toilet device: a bedside commode with staff assist x2 using  gait belt, rolling walker or Farrah Stedy.    Recommend for next PT session: sit to stand transfers, bed to bedside chair transfers, and further progression of gait with existing device    Recommendation for discharge: (in order for the patient to meet his/her long term goals):   Moderate intensity short-term skilled physical therapy up to 5x/week    Other factors to consider for discharge: patient's current support system is unable to meet their requirements for physical assistance, high risk for falls, and concern for safely navigating or managing the home environment    IF patient discharges home will need the following DME: continuing to assess with progress       SUBJECTIVE:   Patient stated, \"my legs don't hurt as much today.\"    OBJECTIVE DATA SUMMARY:   Critical Behavior:  Orientation  Overall Orientation Status: Within Normal Limits  Cognition  Overall Cognitive Status: Exceptions  Attention Span: Difficulty attending to directions  Memory: Appears intact  Safety Judgement: Decreased awareness of need for safety  Problem Solving: Decreased awareness of errors;Assistance required to correct errors made;Assistance required to generate solutions  Initiation: Requires cues for some  Sequencing: Requires cues for some    Functional Mobility Training:  Bed Mobility:  Bed Mobility Training  Supine to Sit: Moderate assistance;Assist X2;Additional time  Scooting: Maximum assistance  Transfers:  Transfer Training  Interventions: Safety awareness training;Verbal cues;Weight shifting training/pressure relief  Sit to Stand: Additional time;Minimum assistance;Assist X2  Stand to Sit: Additional time;Minimum assistance;Assist X1  Balance:  Balance  Sitting: Impaired  Sitting - Static: Good (unsupported)  Sitting - Dynamic: Fair (occasional)  Standing: Impaired;With support  Standing - Static:

## 2024-08-29 NOTE — PROGRESS NOTES
[facility]    Hospitalist Progress Note    NAME: Olimpia Hernandez   :  1945  MRM:  [id number]    Date/Time of service 2024  8:33 AM    To assist coordination of care and communication with nursing and staff, this note may be preliminary early in the day, but finalized by end of the day.        Assessment and Plan:     Hypovolemic shock / Hypotension - POA due to IVVD and anemia.  BP better after hydration, but not great. Added midodrine, increase dose. Normal ECHO.  Levophed stopped.  Stopped lasix, lisinopril.    Acute kidney injury / Dehydration / Hypoalbuminemia / Chronic kidney disease, stage III - POA, due to poor PO intake.  Hydrated and improved. Normal US. Moderate protein in urine.  Likely has CKD3.  Nutrition support.      Overdose of coumadin / Chronic anticoagulation - POA, unclear why INR spiked.  Held coumadin, can resume today. Gave Vitamin K.  Outpatient cardiology to consider Watchman etc.    Anemia / Thrombocytopenia / Hematoma - POA, CT of leg read as hematoma vs abscess. No other sign of infection.  Procalcitonin undetectable.  No SIRS.  Bleeding stopped.  Hb stable.  No Abx    Chronic atrial fibrillation / Hypertension - POA, pulse rate a bit high due to JESSI.  Reduce coreg to avoid low BP.  Resume coumadin.  Stopping lisinopril and Lasix.    Neuropathy / Chronic pain - Continue gabapentin and Ultram.  These contribute to low BP and fall risk. PT OT evaluation recommend SNF and we are arranging that. Scheduled tylenol.    Lymphedema - Stop Lasix for this issue, due to hypotension.  Elevation, SHENA hose and fluid restriction are needed.  Possible follow up in lymphedema clinic.    Hyperlipidemia - LDL 42 on panel.  No hx of CAD nor CVA to demand tight control. Stop statin       Subjective:     Chief Complaint:  Improved left foot and leg pain    ROS:  (bold if positive, if negative)    Tolerating some PT  Tolerating Diet        Objective:     Last 24hrs VS reviewed

## 2024-08-29 NOTE — PROGRESS NOTES
[facility]acy Dosing Services: 08/29/24  Consult for Warfarin Dosing by Pharmacy by Dr. Parker  Consult provided for this 80 yo female for indication of Afib  Day of Therapy: resumed from home. (PTA: According to the patient, 3 mg on MWF, 2 mg on TT, no warfarin on weekends )  Dose to achieve an INR goal of 2-3    Order entered for Warfarin  3 (mg) ordered to be given today at 18:00.     Significant drug interactions: Vitamin K given on 8/24 and 8/25  Previous dose    PT/INR Lab Results   Component Value Date/Time    INR 1.3 08/29/2024 05:41 AM      Platelets Lab Results   Component Value Date/Time     08/27/2024 01:57 AM      H/H Lab Results   Component Value Date/Time    HGB 7.3 08/27/2024 01:57 AM        Pharmacy to follow daily and will provide subsequent Warfarin dosing based on clinical status.  J[facility])   Contact information [phone]

## 2024-08-29 NOTE — PLAN OF CARE
Problem: Safety - Adult  Goal: Free from fall injury  8/28/2024 2351 by Bhavna Ventura, RN  Outcome: Progressing     Problem: Pain  Goal: Verbalizes/displays adequate comfort level or baseline comfort level  8/28/2024 2351 by Bhavna Ventura, RN  Outcome: Progressing  Flowsheets (Taken 8/28/2024 1940)  Verbalizes/displays adequate comfort level or baseline comfort level:   Encourage patient to monitor pain and request assistance   Assess pain using appropriate pain scale   Administer analgesics based on type and severity of pain and evaluate response   Implement non-pharmacological measures as appropriate and evaluate response     Problem: Skin/Tissue Integrity  Goal: Absence of new skin breakdown  Description: 1.  Monitor for areas of redness and/or skin breakdown  2.  Assess vascular access sites hourly  3.  Every 4-6 hours minimum:  Change oxygen saturation probe site  4.  Every 4-6 hours:  If on nasal continuous positive airway pressure, respiratory therapy assess nares and determine need for appliance change or resting period.  8/28/2024 2351 by Bhavna Ventura, RN  Outcome: Progressing

## 2024-08-29 NOTE — CARE COORDINATION
Case Management Progress Note    Discharge Plan:  Anticipate DC to [facility]---Accepted pending SNF AUTH ([employer]).  AUTH started 8/28.    CM met with patient and patient's dtr, Elizabeth Umbehant [phone] and informed them of DCP updates.    Will continue to follow, as patient is medically stable for discharge.    ______________________________________  NICHOLE Lynn, RN-CM  [facility]- Care Management  Available via [employer]  8/29/2024  2:56 PM

## 2024-08-29 NOTE — PLAN OF CARE
Problem: Occupational Therapy - Adult  Goal: By Discharge: Performs self-care activities at highest level of function for planned discharge setting.  See evaluation for individualized goals.  Description: FUNCTIONAL STATUS PRIOR TO ADMISSION:  Patient was independent with ADLs and functional mobility/ ambulatory with a rollator. Doing dishes, laundry, and dog care. Also driving.  No recent falls.      HOME SUPPORT: Patient resided at home with her daughter and son-in-law and did not require assistance.    Occupational Therapy Goals:  Initiated 8/26/2024  1.  Patient will perform bathing with Minimal Assist within 7 day(s).  2.  Patient will perform lower body dressing with Minimal Assist using AE within 7 day(s).  3.  Patient will perform grooming standing at sink with Contact Guard Assist within 7 day(s).  4.  Patient will perform toilet transfers with Minimal Assist  within 7 day(s).  5.  Patient will perform all aspects of toileting with Minimal Assist within 7 day(s).  6.  Patient will participate in upper extremity therapeutic exercise/activities with Supervision for 10 minutes within 7 day(s).    7.  Patient will utilize fall prevention techniques during functional activities with verbal cues within 7 day(s).    Outcome: Progressing     OCCUPATIONAL THERAPY TREATMENT  Patient: Olimpia Hernandez (79 y.o. female)  Date: 8/29/2024  Primary Diagnosis: Hypotension [I95.9]  Septic shock (HCC) [A41.9, R65.21]       Precautions: General Precautions                Chart, occupational therapy assessment, plan of care, and goals were reviewed.    ASSESSMENT  Patient continues to benefit from skilled OT services and is progressing towards goals. She continues to be limited by decreased activity tolerance, strength, balance, and increased pain in LLE. Patient required Mod A x2 to sit EOB, and Min A x 1-2 for transfer to the chair with RW. With upper body dressing and bathing tasks, patient required Min A. With all activity,  patient required substantially increased time and verbal encouragement to complete task. Patient verbalizes fatigue throughout, however agreeable to sit up in chair to eat meal.         PLAN :  Patient continues to benefit from skilled intervention to address the above impairments.  Continue treatment per established plan of care to address goals.    Recommend with staff: Recommend with nursing, ADLs with assist, OOB to chair 3x/day via rolling walker and toileting via beside commode. Thank you for completing as able in order to maintain patient strength, endurance and independence.       Recommend next OT session: continue with POC    Recommendation for discharge: (in order for the patient to meet his/her long term goals):   Moderate intensity short-term skilled occupational therapy up to 5x/week    Other factors to consider for discharge: patient's current support system is unable to meet their requirements for physical assistance, high risk for falls, not safe to be alone, and concern for safely navigating or managing the home environment    IF patient discharges home will need the following DME: continuing to assess with progress       SUBJECTIVE:   Patient stated “I just feel very tired.”    OBJECTIVE DATA SUMMARY:   Cognitive/Behavioral Status:  Orientation  Overall Orientation Status: Within Normal Limits  Cognition  Overall Cognitive Status: Exceptions  Attention Span: Difficulty attending to directions  Memory: Appears intact  Safety Judgement: Decreased awareness of need for safety  Problem Solving: Decreased awareness of errors;Assistance required to correct errors made;Assistance required to generate solutions  Initiation: Requires cues for some  Sequencing: Requires cues for some    Functional Mobility and Transfers for ADLs:  Bed Mobility:  Bed Mobility Training  Supine to Sit: Moderate assistance;Assist X2;Additional time  Scooting: Maximum assistance     Transfers:   Transfer Training  Interventions:

## 2024-08-30 VITALS
DIASTOLIC BLOOD PRESSURE: 64 MMHG | WEIGHT: 194.7 LBS | RESPIRATION RATE: 18 BRPM | HEART RATE: 98 BPM | BODY MASS INDEX: 32.44 KG/M2 | HEIGHT: 65 IN | SYSTOLIC BLOOD PRESSURE: 132 MMHG | OXYGEN SATURATION: 95 % | TEMPERATURE: 99.3 F

## 2024-08-30 LAB
ALBUMIN SERPL-MCNC: 2.2 G/DL (ref 3.5–5)
ALBUMIN/GLOB SERPL: 0.7 (ref 1.1–2.2)
ALP SERPL-CCNC: 74 U/L (ref 45–117)
ALT SERPL-CCNC: 19 U/L (ref 12–78)
ANION GAP SERPL CALC-SCNC: 5 MMOL/L (ref 5–15)
AST SERPL-CCNC: 17 U/L (ref 15–37)
BACTERIA SPEC CULT: NORMAL
BACTERIA SPEC CULT: NORMAL
BASOPHILS # BLD: 0 K/UL (ref 0–0.1)
BASOPHILS NFR BLD: 0 % (ref 0–1)
BILIRUB SERPL-MCNC: 1.8 MG/DL (ref 0.2–1)
BUN SERPL-MCNC: 18 MG/DL (ref 6–20)
BUN/CREAT SERPL: 18 (ref 12–20)
CALCIUM SERPL-MCNC: 8.5 MG/DL (ref 8.5–10.1)
CHLORIDE SERPL-SCNC: 104 MMOL/L (ref 97–108)
CO2 SERPL-SCNC: 23 MMOL/L (ref 21–32)
CREAT SERPL-MCNC: 1.02 MG/DL (ref 0.55–1.02)
DIFFERENTIAL METHOD BLD: ABNORMAL
EOSINOPHIL # BLD: 0.3 K/UL (ref 0–0.4)
EOSINOPHIL NFR BLD: 4 % (ref 0–7)
ERYTHROCYTE [DISTWIDTH] IN BLOOD BY AUTOMATED COUNT: 14.8 % (ref 11.5–14.5)
GLOBULIN SER CALC-MCNC: 3.3 G/DL (ref 2–4)
GLUCOSE SERPL-MCNC: 95 MG/DL (ref 65–100)
HCT VFR BLD AUTO: 23.2 % (ref 35–47)
HGB BLD-MCNC: 7.4 G/DL (ref 11.5–16)
IMM GRANULOCYTES # BLD AUTO: 0 K/UL (ref 0–0.04)
IMM GRANULOCYTES NFR BLD AUTO: 1 % (ref 0–0.5)
INR PPP: 1.7 (ref 0.9–1.1)
LYMPHOCYTES # BLD: 1.4 K/UL (ref 0.8–3.5)
LYMPHOCYTES NFR BLD: 19 % (ref 12–49)
MAGNESIUM SERPL-MCNC: 2 MG/DL (ref 1.6–2.4)
MCH RBC QN AUTO: 29.6 PG (ref 26–34)
MCHC RBC AUTO-ENTMCNC: 31.9 G/DL (ref 30–36.5)
MCV RBC AUTO: 92.8 FL (ref 80–99)
MONOCYTES # BLD: 1 K/UL (ref 0–1)
MONOCYTES NFR BLD: 13 % (ref 5–13)
NEUTS SEG # BLD: 4.6 K/UL (ref 1.8–8)
NEUTS SEG NFR BLD: 63 % (ref 32–75)
NRBC # BLD: 0 K/UL (ref 0–0.01)
NRBC BLD-RTO: 0 PER 100 WBC
PHOSPHATE SERPL-MCNC: 2.3 MG/DL (ref 2.6–4.7)
PLATELET # BLD AUTO: 180 K/UL (ref 150–400)
PMV BLD AUTO: 11.6 FL (ref 8.9–12.9)
POTASSIUM SERPL-SCNC: 4.6 MMOL/L (ref 3.5–5.1)
PROT SERPL-MCNC: 5.5 G/DL (ref 6.4–8.2)
PROTHROMBIN TIME: 17.2 SEC (ref 9–11.1)
RBC # BLD AUTO: 2.5 M/UL (ref 3.8–5.2)
SERVICE CMNT-IMP: NORMAL
SERVICE CMNT-IMP: NORMAL
SODIUM SERPL-SCNC: 132 MMOL/L (ref 136–145)
WBC # BLD AUTO: 7.2 K/UL (ref 3.6–11)

## 2024-08-30 PROCEDURE — 84100 ASSAY OF PHOSPHORUS: CPT

## 2024-08-30 PROCEDURE — 85025 COMPLETE CBC W/AUTO DIFF WBC: CPT

## 2024-08-30 PROCEDURE — 83735 ASSAY OF MAGNESIUM: CPT

## 2024-08-30 PROCEDURE — 6370000000 HC RX 637 (ALT 250 FOR IP): Performed by: STUDENT IN AN ORGANIZED HEALTH CARE EDUCATION/TRAINING PROGRAM

## 2024-08-30 PROCEDURE — 36415 COLL VENOUS BLD VENIPUNCTURE: CPT

## 2024-08-30 PROCEDURE — 2580000003 HC RX 258: Performed by: STUDENT IN AN ORGANIZED HEALTH CARE EDUCATION/TRAINING PROGRAM

## 2024-08-30 PROCEDURE — 85610 PROTHROMBIN TIME: CPT

## 2024-08-30 PROCEDURE — 80053 COMPREHEN METABOLIC PANEL: CPT

## 2024-08-30 PROCEDURE — 6370000000 HC RX 637 (ALT 250 FOR IP): Performed by: INTERNAL MEDICINE

## 2024-08-30 RX ORDER — WARFARIN SODIUM 1 MG/1
3 TABLET ORAL
Status: DISCONTINUED | OUTPATIENT
Start: 2024-08-30 | End: 2024-08-30 | Stop reason: HOSPADM

## 2024-08-30 RX ORDER — CARVEDILOL 3.12 MG/1
3.12 TABLET ORAL 2 TIMES DAILY
Qty: 60 TABLET | Refills: 3 | Status: SHIPPED | OUTPATIENT
Start: 2024-08-30

## 2024-08-30 RX ADMIN — TRAMADOL HYDROCHLORIDE 50 MG: 50 TABLET ORAL at 09:07

## 2024-08-30 RX ADMIN — GABAPENTIN 100 MG: 100 CAPSULE ORAL at 08:45

## 2024-08-30 RX ADMIN — MIDODRINE HYDROCHLORIDE 5 MG: 5 TABLET ORAL at 08:45

## 2024-08-30 RX ADMIN — SODIUM CHLORIDE, PRESERVATIVE FREE 10 ML: 5 INJECTION INTRAVENOUS at 08:45

## 2024-08-30 RX ADMIN — GABAPENTIN 100 MG: 100 CAPSULE ORAL at 13:56

## 2024-08-30 RX ADMIN — MIDODRINE HYDROCHLORIDE 5 MG: 5 TABLET ORAL at 12:38

## 2024-08-30 RX ADMIN — POLYETHYLENE GLYCOL 3350 17 G: 17 POWDER, FOR SOLUTION ORAL at 08:45

## 2024-08-30 ASSESSMENT — PAIN DESCRIPTION - ORIENTATION: ORIENTATION: RIGHT;LEFT

## 2024-08-30 ASSESSMENT — PAIN DESCRIPTION - DESCRIPTORS: DESCRIPTORS: ACHING

## 2024-08-30 ASSESSMENT — PAIN SCALES - GENERAL: PAINLEVEL_OUTOF10: 7

## 2024-08-30 ASSESSMENT — PAIN DESCRIPTION - LOCATION: LOCATION: LEG

## 2024-08-30 NOTE — PLAN OF CARE
Problem: Discharge Planning  Goal: Discharge to home or other facility with appropriate resources  [facility]     Problem: Safety - Adult  Goal: Free from fall injury  Outcome: /[facility] Progressing     Problem: ABCDS Injury Assessment  Goal: Absence of physical injury  Outcome: /[facility] Progressing     Problem: Pain  Goal: Verbalizes/displays adequate comfort level or baseline comfort level  Outcome: HH/[facility] Progressing     Problem: Skin/Tissue Integrity  Goal: Absence of new skin breakdown  Description: 1.  Monitor for areas of redness and/or skin breakdown  2.  Assess vascular access sites hourly  3.  Every 4-6 hours minimum:  Change oxygen saturation probe site  4.  Every 4-6 hours:  If on nasal continuous positive airway pressure, respiratory therapy assess nares and determine need for appliance change or resting period.  Outcome: /[facility] Progressing     Problem: Respiratory - Adult  Goal: Achieves optimal ventilation and oxygenation  Outcome: HH/[facility] Progressing

## 2024-08-30 NOTE — PROGRESS NOTES
Called [facility] and gave report to Maribeth. SBAR given, medications, allergies, history reviewed, continuation of care discussed, and most recent vital signs reported. Opportunity for questions and clarification offered. [facility]

## 2024-08-30 NOTE — PROGRESS NOTES
Occupational Therapy Note:  Chart reviewed and spoke with nursing.  Attempted OT tx this AM.  Patient was received in bed reporting increased fatigue and pain with any movement ( B LE, L>R + back).  She declined participation despite max encouragement. She was resistant to OT follow-up later today however agreeable prior to therapist leaving the room.  Will continue to follow.  Mckenna Guillermo, OTR/L

## 2024-08-30 NOTE — PROGRESS NOTES
[facility] Pharmacy Dosing Services: 08/30/24  Consult for Warfarin Dosing by Pharmacy by Dr. Parker  Consult provided for this 78 yo female for indication of Afib  Day of Therapy: resumed from home. (PTA: Warfarin 3 mg on MWF and 2 mg on TT, no warfarin on weekends )  Dose to achieve an INR goal of 2-3    Order entered for Warfarin 3 mg ordered to be given today at 18:00.     Significant drug interactions: Vitamin K given on 8/24 and 8/25  Previous dose    PT/INR Lab Results   Component Value Date/Time    INR 1.7 08/30/2024 02:55 AM      Platelets Lab Results   Component Value Date/Time     08/30/2024 09:00 AM      H/H Lab Results   Component Value Date/Time    HGB 7.4 08/30/2024 09:00 AM        Pharmacy to follow daily and will provide subsequent Warfarin dosing based on clinical status.  [facility])   Contact information [phone]

## 2024-08-30 NOTE — CARE COORDINATION
8/30/2024  12:55 PM  Transition of Care Plan to SNF/Rehab    Communication to Patient/Family:   Met with patient and family and they are agreeable to the transition plan. The Plan for Transition of Care is related to the following treatment goals: Hypotension [I95.9]  Septic shock (HCC) [A41.9, R65.21]      The Patient and/or patient representative was provided with a choice of provider and agrees  with the discharge plan.      Yes [x] No []    A Freedom of choice list was provided with basic dialogue that supports the patient's individualized plan of care/goals and shares the quality data associated with the providers.       Yes [x] No []    SNF/Rehab Transition:  Patient has been accepted to [facility] SN[facility]hab and meets criteria for admission.     SNF reports auth has been received? [x]  Medicare 3 night stay satisfied? []    [facility] and expected to leave at 3:00 PM. PCS completed, faxed to [facility] with Facesheet to  (successfully processed), and included in packet in chart.     Communication to SNF/Rehab:  Bedside RN, Nicolasa, has been notified to update the transition plan to the facility and call report (phone number , ).  Discharge information has been updated on the AVS. And communicated to facility via anfix/[employer], or CC link.     Discharge instructions faxed to facility via fax to .      Nursing Please include all hard scripts for controlled substances, med rec and dc summary, and AVS in packet.       Nursing, please discuss the following applicable information with the facility's nursing during report:     Vadim with (X) only those applicable:  Medication:  []Medications are available at the facility  []IV Antibiotics    []Controlled Substance - hard copies available sent.  []Weekly Labs    Equipment:  []CPAP/BiPAP  []Wound Vacuum  []Cruz or Urinary Device  []PICC/Central  Line  []Nebulizer  []Ventilator    Treatment:  []Isolation (for MRSA, VRE, etc.)  []Surgical Drain Management  []Tracheostomy Care  []Dressing Changes  []Dialysis with transportation  []PEG Care  []Oxygen  []Daily Weights for Heart Failure    Dietary:  []Any diet limitations  []Tube Feedings   []Total Parenteral Management (TPN)    Financial Resources:    []UAI Completed and copy given to facility or patient/family.     []A screening has previously been completed by external provider; therefore, facility must request UAI directly from external provider.    [x] Private pay individual who will not become   financially eligible for Medicaid within 6 months from admission to a [facility].     [x] Individual refused to have screening conducted, or patient reports intent to return home following rehab.       Advanced Care Plan:  []Surrogate Decision Maker of Care  []POA  []Communicated Code Status and copy sent.    Other:      08/26/24 5249   Service Assessment   Patient Orientation Alert and Oriented   Cognition Alert   History Provided By Patient;Child/Family   Primary Caregiver Other (Comment)  (daughter now,pt was indepoendent prior to falling except for mobility)   Support Systems Children;Family Members   Patient's Healthcare Decision Maker is: Legal Next of Kin  (daughter-Elizabeth Darlene Umbehant @ [phone])   PCP Verified by CM Yes  (BUNNY Oquendo with [facility] also known as [employer])   Last Visit to PCP Within last 3 months   Prior Functional Level Assistance with the following:;Mobility   Current Functional Level Assistance with the following:;Bathing;Dressing;Toileting;Cooking;Housework;Shopping;Mobility   Can patient return to prior living arrangement Yes   Ability to make needs known: Good   Family able to assist with home care needs: Yes   Would you like for me to discuss the discharge plan with any other family members/significant others, and if so, who? Yes  (my

## 2024-08-30 NOTE — PROGRESS NOTES
[facility]    Hospitalist Progress Note    NAME: Olimpia Hernandez   :  1945  MRM:  [id number]    Date/Time of service 2024  6:49 AM    To assist coordination of care and communication with nursing and staff, this note may be preliminary early in the day, but finalized by end of the day.        Assessment and Plan:     Hypovolemic shock / Hypotension - POA due to IVVD and anemia.  BP better after hydration, but not great. Added midodrine now at 5mg. Normal ECHO.  Stopped lasix, lisinopril.    Acute kidney injury / Dehydration / Hypoalbuminemia / Chronic kidney disease, stage III - POA, due to poor PO intake.  Hydrated and improved. Normal US. Moderate protein in urine.  Likely has CKD3.  Nutrition support.      Overdose of coumadin / Chronic anticoagulation - POA, unclear why INR spiked.  Held coumadin, now resumed. Gave Vitamin K initially.  Outpatient cardiology to consider Watchman etc.    Anemia / Thrombocytopenia / Hematoma - POA, CT of leg read as hematoma vs abscess. No other sign of infection.  Procalcitonin undetectable.  No SIRS.  Bleeding stopped.  Hb stable.  No Abx    Chronic atrial fibrillation / Hypertension - POA, pulse rate a bit high due to JESSI.  Reduce coreg to avoid low BP.  Resume coumadin.  Stopped lisinopril and Lasix.    Neuropathy / Chronic pain - Continue gabapentin and Ultram.  These contribute to low BP and fall risk. PT OT evaluation recommend SNF and we are arranging that. Scheduled tylenol.    Lymphedema - Stop Lasix for this issue, due to hypotension.  Elevation, SHENA hose and fluid restriction are needed.  Possible follow up in lymphedema clinic.    Hyperlipidemia - LDL 42 on panel.  No hx of CAD nor CVA to demand tight control. Stop statin       Subjective:     Chief Complaint:  Improved left foot and leg pain    ROS:  (bold if positive, if negative)    Tolerating some PT  Tolerating Diet        Objective:     Last 24hrs VS reviewed since prior  not be used as the sole basis for patient management decisions.Results must be combined with clinical observations, patient history, and epidemiological information.        Rapid Influenza A By PCR Not detected        Rapid Influenza B By PCR Not detected        Comment: Testing was performed using chandrika Allyson SARS-CoV-2 and Influenza A/B nucleic acid assay.  This test is a multiplex Real-Time Reverse Transcriptase Polymerase Chain Reaction (RT-PCR) based in vitro diagnostic test intended for the qualitative detection of nucleic acids from SARS-CoV-2, Influenza A, and Influenza B in nasopharyngeal and nasal swab specimens for use under the FDA's Emergency Use Authorization (EUA) only.     Fact sheet for Patients: https://www.fda.gov/media/791951/download  Fact sheet for Healthcare Providers: https://www.fda.fov/media/205636/download         Culture, Blood 1 [[id number]] Collected: 08/24/24 1959    Order Status: Completed Specimen: Blood Updated: 08/30/24 0555     Special Requests --        NO SPECIAL REQUESTS  LEFT  Antecubital       Culture NO GROWTH 6 DAYS       Culture, Blood 2 [[id number]] Collected: 08/24/24 1959    Order Status: Completed Specimen: Blood Updated: 08/30/24 0555     Special Requests --        NO SPECIAL REQUESTS  RIGHT  Antecubital       Culture NO GROWTH 6 DAYS       Urine Culture Hold Sample [[id number]] Collected: 08/24/24 1915    Order Status: Canceled Specimen: Urine             Other pertinent lab: none    Total time spent with patient: 30 Minutes I personally reviewed chart, notes, data and current medications in the medical record.  I have personally examined and treated the patient at bedside during this period.                  Care Plan discussed with: Patient, Care Manager, Nursing Staff, and >50% of time spent in counseling and coordination of care    Discussed:  Care Plan and D/C Planning    Prophylaxis:  SCD's and H2B/PPI    Disposition:  Home

## 2024-09-12 ENCOUNTER — TRANSCRIBE ORDERS (OUTPATIENT)
Facility: HOSPITAL | Age: 79
End: 2024-09-12

## 2024-09-12 DIAGNOSIS — I89.0 LYMPHEDEMA: Primary | ICD-10-CM

## 2024-09-24 PROBLEM — E86.0 DEHYDRATION: Status: RESOLVED | Noted: 2024-08-25 | Resolved: 2024-09-24

## 2024-09-25 ENCOUNTER — TELEPHONE (OUTPATIENT)
Facility: HOSPITAL | Age: 79
End: 2024-09-25

## 2024-09-25 NOTE — TELEPHONE ENCOUNTER
Received and scanned wound care referral from [facility] Called patient today and no answer unable to LVM as there was no VM available.

## 2024-10-02 ENCOUNTER — HOSPITAL ENCOUNTER (OUTPATIENT)
Facility: HOSPITAL | Age: 79
Discharge: HOME | End: 2024-10-02
Attending: EMERGENCY MEDICINE
Payer: MEDICARE

## 2024-10-02 VITALS
RESPIRATION RATE: 16 BRPM | DIASTOLIC BLOOD PRESSURE: 81 MMHG | SYSTOLIC BLOOD PRESSURE: 110 MMHG | HEART RATE: 105 BPM | TEMPERATURE: 97.9 F

## 2024-10-02 DIAGNOSIS — S81.802S AVULSION OF SKIN OF LOWER LEG, LEFT, SEQUELA: Primary | ICD-10-CM

## 2024-10-02 PROCEDURE — 11045 DBRDMT SUBQ TISS EACH ADDL: CPT

## 2024-10-02 PROCEDURE — 11042 DBRDMT SUBQ TIS 1ST 20SQCM/<: CPT

## 2024-10-02 PROCEDURE — 87205 SMEAR GRAM STAIN: CPT

## 2024-10-02 PROCEDURE — 87077 CULTURE AEROBIC IDENTIFY: CPT

## 2024-10-02 PROCEDURE — 99214 OFFICE O/P EST MOD 30 MIN: CPT

## 2024-10-02 PROCEDURE — 87070 CULTURE OTHR SPECIMN AEROBIC: CPT

## 2024-10-02 PROCEDURE — 87186 SC STD MICRODIL/AGAR DIL: CPT

## 2024-10-02 RX ORDER — GENTAMICIN SULFATE 1 MG/G
OINTMENT TOPICAL ONCE
OUTPATIENT
Start: 2024-10-02 | End: 2024-10-02

## 2024-10-02 RX ORDER — GINSENG 100 MG
CAPSULE ORAL ONCE
OUTPATIENT
Start: 2024-10-02 | End: 2024-10-02

## 2024-10-02 RX ORDER — SODIUM CHLOR/HYPOCHLOROUS ACID 0.033 %
SOLUTION, IRRIGATION IRRIGATION ONCE
OUTPATIENT
Start: 2024-10-02 | End: 2024-10-02

## 2024-10-02 RX ORDER — BETAMETHASONE DIPROPIONATE 0.5 MG/G
CREAM TOPICAL ONCE
OUTPATIENT
Start: 2024-10-02 | End: 2024-10-02

## 2024-10-02 RX ORDER — LIDOCAINE HYDROCHLORIDE 20 MG/ML
JELLY TOPICAL ONCE
OUTPATIENT
Start: 2024-10-02 | End: 2024-10-02

## 2024-10-02 RX ORDER — BACITRACIN ZINC AND POLYMYXIN B SULFATE 500; 1000 [USP'U]/G; [USP'U]/G
OINTMENT TOPICAL ONCE
OUTPATIENT
Start: 2024-10-02 | End: 2024-10-02

## 2024-10-02 RX ORDER — TRIAMCINOLONE ACETONIDE 1 MG/G
OINTMENT TOPICAL ONCE
OUTPATIENT
Start: 2024-10-02 | End: 2024-10-02

## 2024-10-02 RX ORDER — CLOBETASOL PROPIONATE 0.5 MG/G
OINTMENT TOPICAL ONCE
OUTPATIENT
Start: 2024-10-02 | End: 2024-10-02

## 2024-10-02 RX ORDER — LIDOCAINE HYDROCHLORIDE 40 MG/ML
SOLUTION TOPICAL ONCE
OUTPATIENT
Start: 2024-10-02 | End: 2024-10-02

## 2024-10-02 RX ORDER — NEOMYCIN/BACITRACIN/POLYMYXINB 3.5-400-5K
OINTMENT (GRAM) TOPICAL ONCE
OUTPATIENT
Start: 2024-10-02 | End: 2024-10-02

## 2024-10-02 RX ORDER — SILVER SULFADIAZINE 10 MG/G
CREAM TOPICAL ONCE
OUTPATIENT
Start: 2024-10-02 | End: 2024-10-02

## 2024-10-02 RX ORDER — LIDOCAINE 50 MG/G
OINTMENT TOPICAL ONCE
OUTPATIENT
Start: 2024-10-02 | End: 2024-10-02

## 2024-10-02 RX ORDER — LIDOCAINE 40 MG/G
CREAM TOPICAL ONCE
OUTPATIENT
Start: 2024-10-02 | End: 2024-10-02

## 2024-10-02 RX ORDER — MUPIROCIN 20 MG/G
OINTMENT TOPICAL ONCE
OUTPATIENT
Start: 2024-10-02 | End: 2024-10-02

## 2024-10-02 ASSESSMENT — PAIN DESCRIPTION - LOCATION: LOCATION: LEG

## 2024-10-02 ASSESSMENT — PAIN SCALES - GENERAL: PAINLEVEL_OUTOF10: 3

## 2024-10-02 ASSESSMENT — PAIN DESCRIPTION - ORIENTATION: ORIENTATION: LEFT

## 2024-10-02 NOTE — FLOWSHEET NOTE
10/02/24 1044   Left Leg Edema Point of Measurement   Compression Therapy Tubular elastic support bandage   Wound 10/02/24 Leg Left;Posterior #1   Date First Assessed/Time First Assessed: 10/02/24 0952   Present on Original Admission: Yes  Wound Approximate Age at First Assessment (Weeks): 1.5 weeks  Location: Leg  Wound Location Orientation: Left;Posterior  Wound Description (Comments): #1   Wound Image    Dressing/Treatment Cutimed/Sorbact;Roll gauze;Tape/Soft cloth adhesive tape  (superabsorbative)     Discharge Condition: Stable    Pain: 3    Ambulatory Status:Walking and Rollator Walker    Discharge Destination: Home    Transportation:Car    Accompanied by: Self and Family    Discharge instructions reviewed with Self and Family and copy or written instructions have been provided. All questions/concerns have been addressed at this time.

## 2024-10-02 NOTE — WOUND CARE
Wound Care Supplies      Supply Company:     [email or web address]       Ordering Center:     [facility]  [address]  [facility] [place] [address] [phone]  WOUND CARE Dept: [phone]   FAX NUMBER [phone]    Patient Information:      Suellen Gonzales  [address] [facility] [address]   [phone]   : 1945  AGE: 79 y.o.     GENDER: female   EPISODE DATE: 10/2/2024    Insurance:      PRIMARY INSURANCE:  Plan: CIGNA PREFERRED MEDICARE HMO  Coverage: CIGNA MEDICARE  Effective Date: 2023  Group Number: [unfilled]  Subscriber Number: [id number] - (Medicare Managed)    Payer/Plan Subscr  Sex Relation Sub. Ins. ID Effective Group Num   1. CIGNA MEDICAR* SUELLEN GONZALES 1945 Female Self [id number]                                   [address]       Patient Wound Information:      Problem List Items Addressed This Visit          Other    Avulsion of skin of lower leg, left, sequela - Primary    Relevant Orders    Initiate Outpatient Wound Care Protocol    Culture, Wound       WOUNDS REQUIRING DRESSING SUPPLIES:     Wound 24 Pretibial Left (Active)   Number of days: 38       Wound 10/02/24 Leg Left;Posterior #1 (Active)   Wound Image   10/02/24 1044   Wound Cleansed Soap and water 10/02/24 0953   Dressing/Treatment Cutimed/Sorbact;Roll gauze;Tape/Soft cloth adhesive tape 10/02/24 1044   Wound Length (cm) 19.2 cm 10/02/24 0953   Wound Width (cm) 12.6 cm 10/02/24 0953   Wound Depth (cm) 1.6 cm 10/02/24 0953   Wound Surface Area (cm^2) 241.92 cm^2 10/02/24 0953   Wound Volume (cm^3) 387.072 cm^3 10/02/24 0953   Post-Procedure Length (cm) 19.2 cm 10/02/24 1024   Post-Procedure Width (cm) 12.6 cm 10/02/24 1024   Post-Procedure Depth (cm) 2 cm 10/02/24 1024   Post-Procedure Surface Area (cm^2) 241.92 cm^2 10/02/24 1024   Post-Procedure Volume (cm^3) 483.84 cm^3 10/02/24 1024   Distance Tunneling (cm) 3 cm 10/02/24 1024   Tunneling Position ___ O'Clock 3

## 2024-10-02 NOTE — WOUND CARE
Home Health Referral for skilled Nursing wound care       Supply Company:     [employer] [phone]    Ordering Center:     [facility]  [address]  [facility] [address] [phone]  WOUND CARE [phone]  FAX NUMBER [phone]    Patient Information:      Suellen Gonzales  [address] [facility] [address]   [phone]   : 1945  AGE: 79 y.o.     GENDER: female   TODAYS DATE:  10/2/2024    Insurance:      PRIMARY INSURANCE:  [id number] - (Medicare Managed)    Payer/Plan Subscr  Sex Relation Sub. Ins. ID Effective Group Num   1. CIGNA MEDICAR* SUELLEN GONZALES 1945 Female Self [id number]                                   [address]       Patient Wound Information:      Problem List Items Addressed This Visit          Other    Avulsion of skin of lower leg, left, sequela - Primary    Relevant Orders    Initiate Outpatient Wound Care Protocol    Culture, Wound     Wound Care Documentation:  Wound 24 Pretibial Left (Active)   Number of days: 38       Wound 10/02/24 Leg Left;Posterior #1 (Active)   Wound Image   10/02/24 1044   Wound Cleansed Soap and water 10/02/24 0953   Dressing/Treatment Cutimed/Sorbact;Roll gauze;Tape/Soft cloth adhesive tape 10/02/24 1044   Wound Length (cm) 19.2 cm 10/02/24 0953   Wound Width (cm) 12.6 cm 10/02/24 0953   Wound Depth (cm) 1.6 cm 10/02/24 0953   Wound Surface Area (cm^2) 241.92 cm^2 10/02/24 0953   Wound Volume (cm^3) 387.072 cm^3 10/02/24 0953   Post-Procedure Length (cm) 19.2 cm 10/02/24 1024   Post-Procedure Width (cm) 12.6 cm 10/02/24 1024   Post-Procedure Depth (cm) 2 cm 10/02/24 1024   Post-Procedure Surface Area (cm^2) 241.92 cm^2 10/02/24 1024   Post-Procedure Volume (cm^3) 483.84 cm^3 10/02/24 1024   Distance Tunneling (cm) 3 cm 10/02/24 1024   Tunneling Position ___ O'Clock 3 10/02/24 1024   Wound Assessment Pink/red;Slough;Eschar moist 10/02/24 0953   Drainage Amount Large (50-75%  English

## 2024-10-02 NOTE — PATIENT INSTRUCTIONS
Discharge Instructions for  [facility]  [address] [place] [address]  Telephone: [phone]   FAX [phone]    NAME:  Olimpia Hernandez  YOB: 1945  ACCOUNT NUMBER :  [id number]  DATE:  10/2/2024       :AL DORSEY RN     HOME HEALTH:     WOUND SUPPLIES/ DME company : [employer]     REFERRALS or ORDERS:     Wound Cleansing:   Do not scrub or use excessive force.  Cleanse wound prior to applying a clean dressing with:      [x] Cleanse wound after showering with: Jorge and Jorge baby shampoo lather leave 2-3 min then rinse with water     [] May Shower at Discharge     [x] Shower on days of dressing change, remove dressing first (no mickie spring, dove or ivory)    [] Keep Wound Dry in Shower (may purchase a cast cover if needed)     Topical Treatments:  Do not apply lotions, creams, or ointments to wound bed unless directed.     [x] Apply moisturizing lotion A&D ointment to skin surrounding the wound prior to dressing change.  [] Apply   [] Other:      Dressings:           Wound Location : Left posterior leg   Apply Primary Dressing:       [] MediHoney Gel    [] MediHoney Alginate  [] Alginate     [] Alginate with Silver       [] Collagen   [] Collagen with Silver     [] Hydrocolloid  [] Hydrofera Blue Classic (moisten with saline)  [] Hydrofera Blue Ready  [x] Other: Cutimed sorbact mesh   [] Pack wound loosely with      Cover and Secure with:    [x] Gauze   [] Radha   [] Kerlix  [] Ace Wrap     [] ABD  [] Medipore tape  [] tape  [x] Other: Zetuvit no boarder, roll gauze, tape, double tubi     Avoid contact of tape with skin.    Change dressing:   [] Daily      [] Every Other Day   [x] Three times per week, may change as needed for drainage   [] Once a week   [] Do Not Change Dressing     [] Other:     [x] Elevate leg(s)  when sitting.      [x] Avoid prolonged standing in one place.                                         Return

## 2024-10-02 NOTE — FLOWSHEET NOTE
10/02/24 0953   Anesthetic   Anesthetic 4% Lidocaine Liquid Topical   Right Leg Edema Point of Measurement   Leg circumference 40.5 cm   Ankle circumference 30 cm   Left Leg Edema Point of Measurement   Leg circumference 38.5 cm   Ankle circumference 26.5 cm   RLE Neurovascular Assessment   Capillary Refill Greater than 3 seconds   Color Yellow-Brown/Hemosiderin Staining   Temperature Cold   RLE Sensation  Full sensation   R Pedal Pulse Doppler   LLE Neurovascular Assessment   Capillary Refill Less than/Equal to 3 seconds   Color Yellow-Brown/Hemosiderin Staining   Temperature Cold   LLE Sensation  Full sensation   L Pedal Pulse Doppler   Wound 10/02/24 Leg Left;Posterior #1   Date First Assessed/Time First Assessed: 10/02/24 0952   Present on Original Admission: Yes  Wound Approximate Age at First Assessment (Weeks): 1.5 weeks  Location: Leg  Wound Location Orientation: Left;Posterior  Wound Description (Comments): #1   Wound Image    Wound Cleansed Soap and water   Wound Length (cm) 19.2 cm   Wound Width (cm) 12.6 cm   Wound Depth (cm) 1.6 cm  (distal medial corner)   Wound Surface Area (cm^2) 241.92 cm^2   Wound Volume (cm^3) 387.072 cm^3   Wound Assessment Pink/red;Slough;Eschar moist   Drainage Amount Large (50-75% saturated)   Drainage Description Yellow;Serosanguinous   Odor None   Ladonna-wound Assessment Edematous;Blanchable erythema   Margins Flat/open edges;Epibole (rolled edges)   Wound Thickness Description not for Pressure Injury Full thickness     /81   Pulse (!) 105   Temp 97.9 °F (36.6 °C) (Temporal)   Resp 16

## 2024-10-02 NOTE — PROGRESS NOTES
Patient Name:Olimpia Hernandez  : 1945  MRN: [id number]        Past Medical History:   Diagnosis Date    Chronic anticoagulation     Chronic atrial fibrillation (HCC)     Chronic pain     CKD (chronic kidney disease), stage III (HCC)     Hyperlipidemia     Hypertension     Lymphedema     Neuropathy        History reviewed. No pertinent surgical history.    History reviewed. No pertinent family history.    Social History     Tobacco Use    Smoking status: Every Day     Current packs/day: 0.50     Types: Cigarettes    Smokeless tobacco: Never   Substance Use Topics    Alcohol use: Not Currently    Drug use: Never       No Known Allergies    Current Outpatient Medications on File Prior to Encounter   Medication Sig Dispense Refill    carvedilol (COREG) 3.125 MG tablet Take 1 tablet by mouth 2 times daily 60 tablet 3    midodrine (PROAMATINE) 5 MG tablet Take 1 tablet by mouth 3 times daily (with meals) 90 tablet 3    gabapentin (NEURONTIN) 100 MG capsule Take 1 capsule by mouth 3 times daily for 10 days. Max Daily Amount: 300 mg 30 capsule 0    loratadine (CLARITIN) 10 MG tablet Take 1 tablet by mouth daily      warfarin (COUMADIN) 2 MG tablet Take 1 tablet by mouth Pt states taking 3mg Mon, Wed, Fri and 2mg Tue, Thu, Sat, Sun      acetaminophen (TYLENOL) 325 MG tablet Take 2 tablets by mouth every 6 hours as needed for Pain 120 tablet 3     No current facility-administered medications on file prior to encounter.       No results found for: \"LABA1C\"    Wound 24 Pretibial Left (Active)   Number of days: 38       Wound 10/02/24 Leg Left;Posterior #1 (Active)   Wound Image   10/02/24 1044   Wound Cleansed Soap and water 10/02/24 0953   Dressing/Treatment Cutimed/Sorbact;Roll gauze;Tape/Soft cloth adhesive tape 10/02/24 1044   Wound Length (cm) 19.2 cm 10/02/24 0953   Wound Width (cm) 12.6 cm 10/02/24 0953   Wound Depth (cm) 1.6 cm 10/02/24 0953   Wound Surface Area (cm^2) 241.92 cm^2 10/02/24 0953   Wound Volume

## 2024-10-04 NOTE — WOUND CARE
Home Health Referral for skilled Nursing wound care       Supply Company:     [employer]  for skilled wound care     Ordering Center:     [facility]  [address] [place] [address] [phone]  WOUND CARE [phone]  FAX NUMBER [phone]    Patient Information:      Suellen Gonzales  [address] [facility] [address]   [phone]   : 1945  AGE: 79 y.o.     GENDER: female   TODAYS DATE:  10/4/2024    Insurance:      PRIMARY INSURANCE:  [id number] - (Medicare Managed)    Payer/Plan Subscr  Sex Relation Sub. Ins. ID Effective Group Num   1. CIGNA MEDICAR* SUELLEN GONZALES 1945 Female Self [id number]                                   [address]       Patient Wound Information:      Problem List Items Addressed This Visit          Other    Avulsion of skin of lower leg, left, sequela - Primary    Relevant Orders    Culture, Wound (Completed)     Wound Care Documentation:  Wound 24 Pretibial Left (Active)   Number of days: 38       Wound 10/02/24 Leg Left;Posterior #1 (Active)   Wound Image   10/02/24 1044   Wound Cleansed Soap and water 10/02/24 0953   Dressing/Treatment Cutimed/Sorbact;Roll gauze;Tape/Soft cloth adhesive tape 10/02/24 1044   Wound Length (cm) 19.2 cm 10/02/24 0953   Wound Width (cm) 12.6 cm 10/02/24 0953   Wound Depth (cm) 1.6 cm 10/02/24 0953   Wound Surface Area (cm^2) 241.92 cm^2 10/02/24 0953   Wound Volume (cm^3) 387.072 cm^3 10/02/24 0953   Post-Procedure Length (cm) 19.2 cm 10/02/24 1024   Post-Procedure Width (cm) 12.6 cm 10/02/24 1024   Post-Procedure Depth (cm) 2 cm 10/02/24 1024   Post-Procedure Surface Area (cm^2) 241.92 cm^2 10/02/24 1024   Post-Procedure Volume (cm^3) 483.84 cm^3 10/02/24 1024   Distance Tunneling (cm) 3 cm 10/02/24 1024   Tunneling Position ___ O'Clock 3 10/02/24 1024   Wound Assessment Pink/red;Slough;Eschar moist 10/02/24 0953   Drainage Amount Large (50-75% saturated) 10/02/24 09   Drainage

## 2024-10-05 LAB
BACTERIA SPEC CULT: ABNORMAL
GRAM STN SPEC: ABNORMAL
SERVICE CMNT-IMP: ABNORMAL

## 2024-10-09 ENCOUNTER — HOSPITAL ENCOUNTER (OUTPATIENT)
Facility: HOSPITAL | Age: 79
Discharge: HOME | End: 2024-10-09
Attending: EMERGENCY MEDICINE
Payer: MEDICARE

## 2024-10-09 VITALS — HEART RATE: 118 BPM | SYSTOLIC BLOOD PRESSURE: 154 MMHG | DIASTOLIC BLOOD PRESSURE: 92 MMHG | TEMPERATURE: 97.5 F

## 2024-10-09 DIAGNOSIS — S81.802S AVULSION OF SKIN OF LOWER LEG, LEFT, SEQUELA: Primary | ICD-10-CM

## 2024-10-09 PROCEDURE — 11042 DBRDMT SUBQ TIS 1ST 20SQCM/<: CPT

## 2024-10-09 PROCEDURE — 11045 DBRDMT SUBQ TISS EACH ADDL: CPT

## 2024-10-09 RX ORDER — LIDOCAINE HYDROCHLORIDE 40 MG/ML
SOLUTION TOPICAL ONCE
OUTPATIENT
Start: 2024-10-09 | End: 2024-10-09

## 2024-10-09 RX ORDER — NEOMYCIN/BACITRACIN/POLYMYXINB 3.5-400-5K
OINTMENT (GRAM) TOPICAL ONCE
OUTPATIENT
Start: 2024-10-09 | End: 2024-10-09

## 2024-10-09 RX ORDER — LIDOCAINE 50 MG/G
OINTMENT TOPICAL ONCE
OUTPATIENT
Start: 2024-10-09 | End: 2024-10-09

## 2024-10-09 RX ORDER — SODIUM CHLOR/HYPOCHLOROUS ACID 0.033 %
SOLUTION, IRRIGATION IRRIGATION ONCE
OUTPATIENT
Start: 2024-10-09 | End: 2024-10-09

## 2024-10-09 RX ORDER — LIDOCAINE 40 MG/G
CREAM TOPICAL ONCE
OUTPATIENT
Start: 2024-10-09 | End: 2024-10-09

## 2024-10-09 RX ORDER — CLOBETASOL PROPIONATE 0.5 MG/G
OINTMENT TOPICAL ONCE
OUTPATIENT
Start: 2024-10-09 | End: 2024-10-09

## 2024-10-09 RX ORDER — MUPIROCIN 20 MG/G
OINTMENT TOPICAL ONCE
OUTPATIENT
Start: 2024-10-09 | End: 2024-10-09

## 2024-10-09 RX ORDER — TRIAMCINOLONE ACETONIDE 1 MG/G
OINTMENT TOPICAL ONCE
OUTPATIENT
Start: 2024-10-09 | End: 2024-10-09

## 2024-10-09 RX ORDER — SILVER SULFADIAZINE 10 MG/G
CREAM TOPICAL ONCE
OUTPATIENT
Start: 2024-10-09 | End: 2024-10-09

## 2024-10-09 RX ORDER — BETAMETHASONE DIPROPIONATE 0.5 MG/G
CREAM TOPICAL ONCE
OUTPATIENT
Start: 2024-10-09 | End: 2024-10-09

## 2024-10-09 RX ORDER — GENTAMICIN SULFATE 1 MG/G
OINTMENT TOPICAL
Qty: 30 G | Refills: 2 | Status: SHIPPED | OUTPATIENT
Start: 2024-10-09 | End: 2024-10-16

## 2024-10-09 RX ORDER — BACITRACIN ZINC AND POLYMYXIN B SULFATE 500; 1000 [USP'U]/G; [USP'U]/G
OINTMENT TOPICAL ONCE
OUTPATIENT
Start: 2024-10-09 | End: 2024-10-09

## 2024-10-09 RX ORDER — MUPIROCIN 20 MG/G
OINTMENT TOPICAL
Qty: 30 G | Refills: 2 | Status: SHIPPED | OUTPATIENT
Start: 2024-10-09 | End: 2024-10-16

## 2024-10-09 RX ORDER — LIDOCAINE HYDROCHLORIDE 20 MG/ML
JELLY TOPICAL ONCE
OUTPATIENT
Start: 2024-10-09 | End: 2024-10-09

## 2024-10-09 RX ORDER — GENTAMICIN SULFATE 1 MG/G
OINTMENT TOPICAL ONCE
OUTPATIENT
Start: 2024-10-09 | End: 2024-10-09

## 2024-10-09 RX ORDER — GINSENG 100 MG
CAPSULE ORAL ONCE
OUTPATIENT
Start: 2024-10-09 | End: 2024-10-09

## 2024-10-09 ASSESSMENT — PAIN DESCRIPTION - ORIENTATION: ORIENTATION: LEFT

## 2024-10-09 ASSESSMENT — PAIN DESCRIPTION - LOCATION: LOCATION: LEG

## 2024-10-09 ASSESSMENT — PAIN SCALES - GENERAL: PAINLEVEL_OUTOF10: 3

## 2024-10-09 NOTE — PROGRESS NOTES
subq.  Post debridement dimensions changed as noted:    Depth, add 1.0 mm;    Width, add 0.0 mm   Length, add 0.0 mm.   Blood Loss: 3 CCs. Bleeding abated post treatment .   Post Procedure Condition/ Diagnosis: improved, needs MRSA care.  Follow up and Future plans today: RTC 1 week, Dalvance tomorrow, mupirocin topical.    Specimens: 0.  Patient Counseled regarding/Discussed: as above.  Clinical Considerations: alert to infection care, avoid trauma.    Dx Codes: S81.802S.    Shreyas Worthington MD  10/9/2024

## 2024-10-09 NOTE — FLOWSHEET NOTE
10/09/24 1007   Anesthetic   Anesthetic 4% Lidocaine Liquid Topical   Left Leg Edema Point of Measurement   Leg circumference 41.5 cm   Ankle circumference 27.8 cm   LLE Neurovascular Assessment   Capillary Refill Less than/Equal to 3 seconds   Color Yellow-Brown/Hemosiderin Staining   Temperature Cool   L Pedal Pulse +2   Wound 10/02/24 Leg Left;Posterior #1   Date First Assessed/Time First Assessed: 10/02/24 0952   Present on Original Admission: Yes  Wound Approximate Age at First Assessment (Weeks): 1.5 weeks  Location: Leg  Wound Location Orientation: Left;Posterior  Wound Description (Comments): #1   Wound Image    Wound Cleansed Vashe   Wound Length (cm) 18.5 cm   Wound Width (cm) 11.8 cm   Wound Depth (cm) 0.3 cm   Wound Surface Area (cm^2) 218.3 cm^2   Change in Wound Size % (l*w) 9.76   Wound Volume (cm^3) 65.49 cm^3   Wound Healing % 83   Wound Assessment Granulation tissue;Pink/red;Slough;Epithelialization   Drainage Amount Large (50-75% saturated)   Drainage Description Serosanguinous;Green   Odor None   Ladonna-wound Assessment Edematous;Blanchable erythema   Margins Flat/open edges;Epibole (rolled edges)   Wound Thickness Description not for Pressure Injury Full thickness   Pain Assessment   Pain Assessment 0-10   Pain Level 3   Pain Location Leg   Pain Orientation Left     BP (!) 154/92   Pulse (!) 118   Temp 97.5 °F (36.4 °C) (Temporal)

## 2024-10-09 NOTE — FLOWSHEET NOTE
10/09/24 1048   Left Leg Edema Point of Measurement   Compression Therapy Tubular elastic support bandage   Tubular Elastic Support Bandage Compression Pressure Medium   Wound 10/02/24 Leg Left;Posterior #1   Date First Assessed/Time First Assessed: 10/02/24 0952   Present on Original Admission: Yes  Wound Approximate Age at First Assessment (Weeks): 1.5 weeks  Location: Leg  Wound Location Orientation: Left;Posterior  Wound Description (Comments): #1   Dressing/Treatment Cutimed/Sorbact;Gauze dressing/dressing sponge;Other (comment);Roll gauze;Tape/Soft cloth adhesive tape  (Mupirocin ointment to wound base, zetuvit)     Discharge Condition: Stable    Pain: 2    Ambulatory Status: Rolling Walker    Discharge Destination: home    Transportation:car    Accompanied by: SELF    Discharge instructions reviewed with SELF and copy or written instructions have been provided. All questions/concerns have been addressed at this time.

## 2024-10-09 NOTE — PATIENT INSTRUCTIONS
Daily      [] Every Other Day   [x] Three times per week, may change as needed for drainage   [] Once a week   [] Do Not Change Dressing     [] Other:     [x] Elevate leg(s)  when sitting.      [x] Avoid prolonged standing in one place.                                         Return Appointment:    [x] Return Appointment: With Dr. Shreyas Worthington  in 1 Week(s)        Wound Care Center Information: Should you experience any significant changes in your wound(s) or have questions about your wound care, please contact the [facility] at MONDAY-THURSDAY 8:00 am - 4:30 AND Friday 8:00 AM- 12:00 PM .  If you need help with your wound outside these hours and cannot wait until we are again available, contact your PCP or go to the hospital emergency room.     PLEASE NOTE: IF YOU ARE UNABLE TO OBTAIN WOUND SUPPLIES, CONTINUE TO USE THE SUPPLIES YOU HAVE AVAILABLE UNTIL YOU ARE ABLE TO REACH US. IT IS MOST IMPORTANT TO KEEP THE WOUND COVERED AT ALL TIMES.     Physician Signature:_______________________ Dr. Shreyas Worthington

## 2024-10-10 ENCOUNTER — HOSPITAL ENCOUNTER (OUTPATIENT)
Facility: HOSPITAL | Age: 79
Setting detail: INFUSION SERIES
Discharge: HOME | End: 2024-10-10
Payer: MEDICARE

## 2024-10-10 VITALS
BODY MASS INDEX: 27.94 KG/M2 | OXYGEN SATURATION: 100 % | DIASTOLIC BLOOD PRESSURE: 71 MMHG | SYSTOLIC BLOOD PRESSURE: 127 MMHG | WEIGHT: 167.9 LBS | TEMPERATURE: 98 F | HEART RATE: 85 BPM | RESPIRATION RATE: 18 BRPM

## 2024-10-10 DIAGNOSIS — A49.02 INFECTION OF WOUND DUE TO METHICILLIN RESISTANT STAPHYLOCOCCUS AUREUS (MRSA): Primary | ICD-10-CM

## 2024-10-10 PROCEDURE — 6360000002 HC RX W HCPCS: Performed by: EMERGENCY MEDICINE

## 2024-10-10 PROCEDURE — 96365 THER/PROPH/DIAG IV INF INIT: CPT

## 2024-10-10 PROCEDURE — 2580000003 HC RX 258: Performed by: EMERGENCY MEDICINE

## 2024-10-10 RX ORDER — SODIUM CHLORIDE 9 MG/ML
5-250 INJECTION, SOLUTION INTRAVENOUS PRN
OUTPATIENT
Start: 2024-10-10

## 2024-10-10 RX ORDER — SODIUM CHLORIDE 0.9 % (FLUSH) 0.9 %
5-40 SYRINGE (ML) INJECTION PRN
Status: DISCONTINUED | OUTPATIENT
Start: 2024-10-10 | End: 2024-10-11 | Stop reason: HOSPADM

## 2024-10-10 RX ORDER — MUPIROCIN 20 MG/G
OINTMENT TOPICAL DAILY
COMMUNITY

## 2024-10-10 RX ORDER — SODIUM CHLORIDE 0.9 % (FLUSH) 0.9 %
5-40 SYRINGE (ML) INJECTION PRN
OUTPATIENT
Start: 2024-10-10

## 2024-10-10 RX ORDER — GENTAMICIN SULFATE 1 MG/G
OINTMENT TOPICAL DAILY
COMMUNITY

## 2024-10-10 RX ADMIN — SODIUM CHLORIDE 10 ML: 9 INJECTION, SOLUTION INTRAMUSCULAR; INTRAVENOUS; SUBCUTANEOUS at 13:17

## 2024-10-10 RX ADMIN — SODIUM CHLORIDE 10 ML: 9 INJECTION, SOLUTION INTRAMUSCULAR; INTRAVENOUS; SUBCUTANEOUS at 15:24

## 2024-10-10 RX ADMIN — DALBAVANCIN 1500 MG: 500 INJECTION, POWDER, FOR SOLUTION INTRAVENOUS at 14:53

## 2024-10-10 NOTE — WOUND CARE
Home Health Referral for skilled Nursing wound care       Bryce Enrique [phone]    Ordering Center:     [facility]  [address] [facility] [address] [phone]  WOUND CARE [phone]  FAX NUMBER [phone]    Patient Information:      Suellen Gonzales  [address] [facility] [address]   [phone]   : 1945  AGE: 79 y.o.     GENDER: female   TODAYS DATE:  10/10/2024    Insurance:      PRIMARY INSURANCE:  [id number] - (Medicare Managed)    Payer/Plan Subscr  Sex Relation Sub. Ins. ID Effective Group Num   1. CIGNA MEDICAR* SUELLEN GONZALES 1945 Female Self [id number]                                   [address]       Patient Wound Information:      Problem List Items Addressed This Visit          Other    Avulsion of skin of lower leg, left, sequela - Primary     Wound Care Documentation:  Wound 24 Pretibial Left (Active)   Number of days: 46       Wound 10/02/24 Leg Left;Posterior #1 (Active)   Wound Image   10/09/24 1007   Wound Etiology Traumatic 10/02/24 1044   Wound Cleansed Vashe 10/09/24 1007   Dressing/Treatment Cutimed/Sorbact;Gauze dressing/dressing sponge;Other (comment);Roll gauze;Tape/Soft cloth adhesive tape 10/09/24 1048   Wound Length (cm) 18.5 cm 10/09/24 1007   Wound Width (cm) 11.8 cm 10/09/24 1007   Wound Depth (cm) 0.3 cm 10/09/24 1007   Wound Surface Area (cm^2) 218.3 cm^2 10/09/24 1007   Change in Wound Size % (l*w) 9.76 10/09/24 1007   Wound Volume (cm^3) 65.49 cm^3 10/09/24 1007   Wound Healing % 83 10/09/24 1007   Post-Procedure Length (cm) 18.5 cm 10/09/24 1032   Post-Procedure Width (cm) 11.8 cm 10/09/24 1032   Post-Procedure Depth (cm) 1.4 cm 10/09/24 1032   Post-Procedure Surface Area (cm^2) 218.3 cm^2 10/09/24 1032   Post-Procedure Volume (cm^3) 305.62 cm^3 10/09/24 1032   Distance Tunneling (cm) 3 cm 10/02/24 1024   Tunneling Position ___ O'Clock 3 10/02/24 1024   Wound Assessment Granulation

## 2024-10-10 NOTE — PROGRESS NOTES
OPIC Short Note                       Date: October 10, 2024    Name: Olimpia Hernandez    MRN: [id number]         : 1945    Treatment: Dalvance    OPIC COVID-19 SCREENING      The patient was asked the following questions and answered as documented below:    Do you have any symptoms of COVID-19?  SOB, coughing, fever, or generally not feeling well? NO  Have you been exposed to COVID-19 recently? NO  Have you had any recent contact with family/friend that has a pending COVID test? NO      Follow Up: Proceed with treatment    Ms. Hernandez was assessed and education was provided. Patient arrived for IV infusion for LLE wound. Vss, IV placed to the RAC w/o difficulty.     Lines:   Peripheral IV 10/10/24 Right Antecubital (Active)   Site Assessment Clean, dry & intact 10/10/24 1317   Line Status Brisk blood return 10/10/24 1317   Phlebitis Assessment No symptoms 10/10/24 1317   Infiltration Assessment 0 10/10/24 1317   Dressing Status New dressing applied 10/10/24 1317   Dressing Type Transparent 10/10/24 1317   Dressing Intervention New 10/10/24 1317        Ms. Hernandez's vitals were reviewed prior to and after treatment.   Patient Vitals for the past 12 hrs:   Temp Pulse Resp BP SpO2   10/10/24 1527 -- 85 -- 127/71 --   10/10/24 1311 98 °F (36.7 °C) 88 18 122/70 100 %       Medications given:  Medications Administered         dalbavancin (DALVANCE) 1,500 mg in dextrose 5 % 250 mL IVPB Admin Date  10/10/2024 Action  New Bag Dose  1,500 mg Rate  700 mL/hr Route  IntraVENous Documented By  Kajal Vines RN        sodium chloride flush 0.9 % injection 5-40 mL Admin Date  10/10/2024 Action  Given Dose  10 mL Rate   Route  IntraVENous Documented By  Kajal Vines RN        sodium chloride flush 0.9 % injection 5-40 mL Admin Date  10/10/2024 Action  Given Dose  10 mL Rate   Route  IntraVENous Documented By  Kajal Vines, RN            Ms. Hernandez tolerated the treatment without complaints. VSS, IV flushed and removed.

## 2024-10-16 ENCOUNTER — HOSPITAL ENCOUNTER (OUTPATIENT)
Facility: HOSPITAL | Age: 79
Discharge: HOME | End: 2024-10-16
Attending: EMERGENCY MEDICINE
Payer: MEDICARE

## 2024-10-16 VITALS
SYSTOLIC BLOOD PRESSURE: 156 MMHG | TEMPERATURE: 97.2 F | RESPIRATION RATE: 18 BRPM | HEART RATE: 97 BPM | DIASTOLIC BLOOD PRESSURE: 72 MMHG

## 2024-10-16 DIAGNOSIS — S81.802S AVULSION OF SKIN OF LOWER LEG, LEFT, SEQUELA: Primary | ICD-10-CM

## 2024-10-16 PROCEDURE — 11045 DBRDMT SUBQ TISS EACH ADDL: CPT

## 2024-10-16 PROCEDURE — 11042 DBRDMT SUBQ TIS 1ST 20SQCM/<: CPT

## 2024-10-16 RX ORDER — SODIUM CHLOR/HYPOCHLOROUS ACID 0.033 %
SOLUTION, IRRIGATION IRRIGATION ONCE
OUTPATIENT
Start: 2024-10-16 | End: 2024-10-16

## 2024-10-16 RX ORDER — BACITRACIN ZINC AND POLYMYXIN B SULFATE 500; 1000 [USP'U]/G; [USP'U]/G
OINTMENT TOPICAL ONCE
OUTPATIENT
Start: 2024-10-16 | End: 2024-10-16

## 2024-10-16 RX ORDER — TRIAMCINOLONE ACETONIDE 1 MG/G
OINTMENT TOPICAL ONCE
OUTPATIENT
Start: 2024-10-16 | End: 2024-10-16

## 2024-10-16 RX ORDER — SILVER SULFADIAZINE 10 MG/G
CREAM TOPICAL ONCE
OUTPATIENT
Start: 2024-10-16 | End: 2024-10-16

## 2024-10-16 RX ORDER — LIDOCAINE HYDROCHLORIDE 20 MG/ML
JELLY TOPICAL ONCE
OUTPATIENT
Start: 2024-10-16 | End: 2024-10-16

## 2024-10-16 RX ORDER — NEOMYCIN/BACITRACIN/POLYMYXINB 3.5-400-5K
OINTMENT (GRAM) TOPICAL ONCE
OUTPATIENT
Start: 2024-10-16 | End: 2024-10-16

## 2024-10-16 RX ORDER — MUPIROCIN 20 MG/G
OINTMENT TOPICAL ONCE
OUTPATIENT
Start: 2024-10-16 | End: 2024-10-16

## 2024-10-16 RX ORDER — LIDOCAINE HYDROCHLORIDE 40 MG/ML
SOLUTION TOPICAL ONCE
OUTPATIENT
Start: 2024-10-16 | End: 2024-10-16

## 2024-10-16 RX ORDER — BETAMETHASONE DIPROPIONATE 0.5 MG/G
CREAM TOPICAL ONCE
OUTPATIENT
Start: 2024-10-16 | End: 2024-10-16

## 2024-10-16 RX ORDER — LIDOCAINE 50 MG/G
OINTMENT TOPICAL ONCE
OUTPATIENT
Start: 2024-10-16 | End: 2024-10-16

## 2024-10-16 RX ORDER — GINSENG 100 MG
CAPSULE ORAL ONCE
OUTPATIENT
Start: 2024-10-16 | End: 2024-10-16

## 2024-10-16 RX ORDER — LIDOCAINE 40 MG/G
CREAM TOPICAL ONCE
OUTPATIENT
Start: 2024-10-16 | End: 2024-10-16

## 2024-10-16 RX ORDER — CLOBETASOL PROPIONATE 0.5 MG/G
OINTMENT TOPICAL ONCE
OUTPATIENT
Start: 2024-10-16 | End: 2024-10-16

## 2024-10-16 RX ORDER — GENTAMICIN SULFATE 1 MG/G
OINTMENT TOPICAL ONCE
OUTPATIENT
Start: 2024-10-16 | End: 2024-10-16

## 2024-10-16 ASSESSMENT — PAIN DESCRIPTION - PAIN TYPE: TYPE: CHRONIC PAIN

## 2024-10-16 ASSESSMENT — PAIN DESCRIPTION - FREQUENCY: FREQUENCY: INTERMITTENT

## 2024-10-16 ASSESSMENT — PAIN DESCRIPTION - ORIENTATION: ORIENTATION: LEFT

## 2024-10-16 ASSESSMENT — PAIN SCALES - GENERAL: PAINLEVEL_OUTOF10: 4

## 2024-10-16 ASSESSMENT — PAIN DESCRIPTION - ONSET: ONSET: ON-GOING

## 2024-10-16 ASSESSMENT — PAIN DESCRIPTION - DESCRIPTORS: DESCRIPTORS: ACHING

## 2024-10-16 ASSESSMENT — PAIN DESCRIPTION - LOCATION: LOCATION: LEG

## 2024-10-16 ASSESSMENT — PAIN - FUNCTIONAL ASSESSMENT: PAIN_FUNCTIONAL_ASSESSMENT: PREVENTS OR INTERFERES SOME ACTIVE ACTIVITIES AND ADLS

## 2024-10-16 NOTE — PROGRESS NOTES
[facility]   Progress Note and Procedure Note   NO Procedure    Patient Name: Olimpia Hernandez  : 1945  MRN: [id number]    Past Medical History:   Diagnosis Date    Chronic anticoagulation     Chronic atrial fibrillation (HCC)     Chronic pain     CKD (chronic kidney disease), stage III (HCC)     Hyperlipidemia     Hypertension     Lymphedema     Neuropathy      History reviewed. No pertinent surgical history.  History reviewed. No pertinent family history.  Social History     Tobacco Use    Smoking status: Every Day     Current packs/day: 0.50     Types: Cigarettes    Smokeless tobacco: Never   Substance Use Topics    Alcohol use: Not Currently    Drug use: Never     No Known Allergies  Current Outpatient Medications on File Prior to Encounter   Medication Sig Dispense Refill    mupirocin (BACTROBAN) 2 % ointment Apply topically daily Apply topically daily to wound after cleansing.      gentamicin (GARAMYCIN) 0.1 % ointment Apply topically daily Apply topically  daily to wound after cleansing      mupirocin (BACTROBAN) 2 % ointment Apply topically to wound at dressing time 30 g 2    gentamicin (GARAMYCIN) 0.1 % ointment Apply topically at dressing time 30 g 2    carvedilol (COREG) 3.125 MG tablet Take 1 tablet by mouth 2 times daily 60 tablet 3    midodrine (PROAMATINE) 5 MG tablet Take 1 tablet by mouth 3 times daily (with meals) 90 tablet 3    acetaminophen (TYLENOL) 325 MG tablet Take 2 tablets by mouth every 6 hours as needed for Pain 120 tablet 3    loratadine (CLARITIN) 10 MG tablet Take 1 tablet by mouth daily      warfarin (COUMADIN) 2 MG tablet Take 1 tablet by mouth Pt states taking 3mg Mon, Wed, Fri and 2mg Tue, Thu, Sat, Sun      gabapentin (NEURONTIN) 100 MG capsule Take 1 capsule by mouth 3 times daily for 10 days. Max Daily Amount: 300 mg 30 capsule 0     No current facility-administered medications on file prior to encounter.     No results found for:

## 2024-10-16 NOTE — FLOWSHEET NOTE
10/16/24 1052   Left Leg Edema Point of Measurement   Compression Therapy Tubular elastic support bandage   Tubular Elastic Support Bandage Compression Pressure Medium   Wound 10/02/24 Leg Left;Posterior #1   Date First Assessed/Time First Assessed: 10/02/24 0952   Present on Original Admission: Yes  Wound Approximate Age at First Assessment (Weeks): 1.5 weeks  Location: Leg  Wound Location Orientation: Left;Posterior  Wound Description (Comments): #1   Dressing/Treatment Cutimed/Sorbact;Gauze dressing/dressing sponge;Roll gauze;Tape/Soft cloth adhesive tape;Other (comment)  (drawtex)     Discharge Condition: Stable    Pain: 4    Ambulatory Status: Rolling Walker    Discharge Destination: home    Transportation:car    Accompanied by: FAMILY    Discharge instructions reviewed with FAMILY and patient and copy or written instructions have been provided. All questions/concerns have been addressed at this time.

## 2024-10-16 NOTE — PATIENT INSTRUCTIONS
Discharge Instructions for  [facility]  [address] [place] [address]  Telephone: [phone]   FAX [phone]    NAME:  Olimpia Hernandez  YOB: 1945  ACCOUNT NUMBER :  [id number]  DATE:  10/16/2024       :AL DORSEY RN     HOME HEALTH:     WOUND SUPPLIES/ DME company : [id number]       Wound Cleansing:   Do not scrub or use excessive force.  Cleanse wound prior to applying a clean dressing with:      [x] Cleanse wound after showering with: Jorge and Jorge baby shampoo lather leave 2-3 min then rinse with water     [] May Shower at Discharge     [x] Shower on days of dressing change, remove dressing first (no [place] spring, dove or ivory)    [] Keep Wound Dry in Shower (may purchase a cast cover if needed)     Topical Treatments:  Do not apply lotions, creams, or ointments to wound bed unless directed.     [x] Apply moisturizing lotion A&D ointment to skin surrounding the wound prior to dressing change.  [] Apply   [] Other:      Dressings:           Wound Location : Left posterior leg   Apply Primary Dressing:       [] MediHoney Gel    [] MediHoney Alginate  [] Alginate     [] Alginate with Silver       [] Collagen   [] Collagen with Silver     [] Hydrocolloid  [] Hydrofera Blue Classic (moisten with saline)  [] Hydrofera Blue Ready  [x] Other: Cutimed sorbact mesh   [] Pack wound loosely with      Cover and Secure with:    [x] Gauze   [] Radha   [] Kerlix  [] Ace Wrap     [] ABD  [] Medipore tape  [] tape  [x] Other: Drawtex, roll gauze, tape, double tubi  (please make into a sock, tubi is rolling up)    Avoid contact of tape with skin.    Change dressing:   [] Daily      [] Every Other Day   [x] Three times per week, may change as needed for drainage   [] Once a week   [] Do Not Change Dressing     [] Other:     [x] Elevate leg(s)  when sitting.      [x] Avoid prolonged standing in one place.                                         Return

## 2024-10-16 NOTE — FLOWSHEET NOTE
10/16/24 1020   Anesthetic   Anesthetic 4% Lidocaine Liquid Topical   Left Leg Edema Point of Measurement   Leg circumference 38 cm   Ankle circumference 28.5 cm   LLE Neurovascular Assessment   Capillary Refill Less than/Equal to 3 seconds   Color Yellow-Brown/Hemosiderin Staining   Temperature Cool   L Pedal Pulse Doppler   Wound 10/02/24 Leg Left;Posterior #1   Date First Assessed/Time First Assessed: 10/02/24 0952   Present on Original Admission: Yes  Wound Approximate Age at First Assessment (Weeks): 1.5 weeks  Location: Leg  Wound Location Orientation: Left;Posterior  Wound Description (Comments): #1   Wound Image    Wound Etiology Traumatic   Wound Cleansed Cleansed with saline   Wound Length (cm) 17.8 cm   Wound Width (cm) 9.1 cm   Wound Depth (cm) 0.2 cm   Wound Surface Area (cm^2) 161.98 cm^2   Change in Wound Size % (l*w) 33.04   Wound Volume (cm^3) 32.396 cm^3   Wound Healing % 92   Wound Assessment Granulation tissue;Hyper granulation tissue;Pink/red;Slough;Epithelialization   Drainage Amount Large (50-75% saturated)   Drainage Description Serosanguinous   Odor None   Ladonna-wound Assessment Edematous   Margins Flat/open edges   Wound Thickness Description not for Pressure Injury Full thickness     BP (!) 156/72   Pulse 97   Temp 97.2 °F (36.2 °C) (Temporal)   Resp 18

## 2024-10-30 ENCOUNTER — HOSPITAL ENCOUNTER (OUTPATIENT)
Facility: HOSPITAL | Age: 79
Discharge: HOME | End: 2024-10-30
Attending: EMERGENCY MEDICINE
Payer: MEDICARE

## 2024-10-30 VITALS
OXYGEN SATURATION: 98 % | HEART RATE: 92 BPM | RESPIRATION RATE: 20 BRPM | SYSTOLIC BLOOD PRESSURE: 138 MMHG | TEMPERATURE: 97.9 F | DIASTOLIC BLOOD PRESSURE: 80 MMHG

## 2024-10-30 DIAGNOSIS — S81.802S AVULSION OF SKIN OF LOWER LEG, LEFT, SEQUELA: Primary | ICD-10-CM

## 2024-10-30 PROCEDURE — 11045 DBRDMT SUBQ TISS EACH ADDL: CPT

## 2024-10-30 PROCEDURE — 11042 DBRDMT SUBQ TIS 1ST 20SQCM/<: CPT

## 2024-10-30 RX ORDER — GENTAMICIN SULFATE 1 MG/G
OINTMENT TOPICAL ONCE
OUTPATIENT
Start: 2024-10-30 | End: 2024-10-30

## 2024-10-30 RX ORDER — BACITRACIN ZINC AND POLYMYXIN B SULFATE 500; 1000 [USP'U]/G; [USP'U]/G
OINTMENT TOPICAL ONCE
OUTPATIENT
Start: 2024-10-30 | End: 2024-10-30

## 2024-10-30 RX ORDER — LIDOCAINE HYDROCHLORIDE 40 MG/ML
SOLUTION TOPICAL ONCE
OUTPATIENT
Start: 2024-10-30 | End: 2024-10-30

## 2024-10-30 RX ORDER — BETAMETHASONE DIPROPIONATE 0.5 MG/G
CREAM TOPICAL ONCE
OUTPATIENT
Start: 2024-10-30 | End: 2024-10-30

## 2024-10-30 RX ORDER — LIDOCAINE 50 MG/G
OINTMENT TOPICAL ONCE
OUTPATIENT
Start: 2024-10-30 | End: 2024-10-30

## 2024-10-30 RX ORDER — CLOBETASOL PROPIONATE 0.5 MG/G
OINTMENT TOPICAL ONCE
OUTPATIENT
Start: 2024-10-30 | End: 2024-10-30

## 2024-10-30 RX ORDER — SODIUM CHLOR/HYPOCHLOROUS ACID 0.033 %
SOLUTION, IRRIGATION IRRIGATION ONCE
OUTPATIENT
Start: 2024-10-30 | End: 2024-10-30

## 2024-10-30 RX ORDER — SILVER SULFADIAZINE 10 MG/G
CREAM TOPICAL ONCE
OUTPATIENT
Start: 2024-10-30 | End: 2024-10-30

## 2024-10-30 RX ORDER — MUPIROCIN 20 MG/G
OINTMENT TOPICAL ONCE
OUTPATIENT
Start: 2024-10-30 | End: 2024-10-30

## 2024-10-30 RX ORDER — GINSENG 100 MG
CAPSULE ORAL ONCE
OUTPATIENT
Start: 2024-10-30 | End: 2024-10-30

## 2024-10-30 RX ORDER — LIDOCAINE HYDROCHLORIDE 20 MG/ML
JELLY TOPICAL ONCE
OUTPATIENT
Start: 2024-10-30 | End: 2024-10-30

## 2024-10-30 RX ORDER — TRIAMCINOLONE ACETONIDE 1 MG/G
OINTMENT TOPICAL ONCE
OUTPATIENT
Start: 2024-10-30 | End: 2024-10-30

## 2024-10-30 RX ORDER — NEOMYCIN/BACITRACIN/POLYMYXINB 3.5-400-5K
OINTMENT (GRAM) TOPICAL ONCE
OUTPATIENT
Start: 2024-10-30 | End: 2024-10-30

## 2024-10-30 RX ORDER — LIDOCAINE 40 MG/G
CREAM TOPICAL ONCE
OUTPATIENT
Start: 2024-10-30 | End: 2024-10-30

## 2024-10-30 ASSESSMENT — PAIN SCALES - GENERAL: PAINLEVEL_OUTOF10: 4

## 2024-10-30 ASSESSMENT — PAIN DESCRIPTION - ORIENTATION: ORIENTATION: LEFT

## 2024-10-30 ASSESSMENT — PAIN DESCRIPTION - LOCATION: LOCATION: LEG

## 2024-10-30 NOTE — FLOWSHEET NOTE
10/30/24 1024   Anesthetic   Anesthetic 4% Lidocaine Liquid Topical   Left Leg Edema Point of Measurement   Leg circumference 36 cm   Ankle circumference 24.8 cm   LLE Neurovascular Assessment   Capillary Refill Greater than 3 seconds   Color Yellow-Brown/Hemosiderin Staining   Temperature Cool   L Pedal Pulse +2   Wound 10/02/24 Leg Left;Posterior #1   Date First Assessed/Time First Assessed: 10/02/24 0952   Present on Original Admission: Yes  Wound Approximate Age at First Assessment (Weeks): 1.5 weeks  Location: Leg  Wound Location Orientation: Left;Posterior  Wound Description (Comments): #1   Wound Image    Wound Cleansed Cleansed with saline   Wound Length (cm) 12.2 cm   Wound Width (cm) 7 cm   Wound Depth (cm) 0.1 cm   Wound Surface Area (cm^2) 85.4 cm^2   Change in Wound Size % (l*w) 64.7   Wound Volume (cm^3) 8.54 cm^3   Wound Healing % 98   Wound Assessment Granulation tissue   Drainage Amount Moderate (25-50%)   Drainage Description Serosanguinous   Odor None   Ladonna-wound Assessment Dry/flaky   Margins Flat/open edges   Wound Thickness Description not for Pressure Injury Full thickness     /80   Pulse 92   Temp 97.9 °F (36.6 °C) (Temporal)   Resp 20   SpO2 98%

## 2024-10-30 NOTE — FLOWSHEET NOTE
10/30/24 1113   Left Leg Edema Point of Measurement   Compression Therapy Tubular elastic support bandage   Wound 10/02/24 Leg Left;Posterior #1   Date First Assessed/Time First Assessed: 10/02/24 0952   Present on Original Admission: Yes  Wound Approximate Age at First Assessment (Weeks): 1.5 weeks  Location: Leg  Wound Location Orientation: Left;Posterior  Wound Description (Comments): #1   Dressing/Treatment Roll gauze;Xeroform  (drawtex)     Discharge Condition: Stable    Pain: 2    Ambulatory Status:Rollator Walker    Discharge Destination: Home    Transportation:Car    Accompanied by: Family    Discharge instructions reviewed with Self and daughter and copy or written instructions have been provided. All questions/concerns have been addressed at this time.

## 2024-10-30 NOTE — PATIENT INSTRUCTIONS
Discharge Instructions for  [facility]  [address] [place] [address]  Telephone: [phone]   FAX [phone]    NAME:  Olimpia Hernandez  YOB: 1945  ACCOUNT NUMBER :  [id number]  DATE:  10/30/2024       :AL DORSEY RN     HOME HEALTH:     WOUND SUPPLIES/ DME company : [employer]       Wound Cleansing:   Do not scrub or use excessive force.  Cleanse wound prior to applying a clean dressing with:      [x] Cleanse wound after showering with: Jorge and Jorge baby shampoo lather leave 2-3 min then rinse with water     [] May Shower at Discharge     [x] Shower on days of dressing change, remove dressing first (no [place] citizen spring, dove or ivory)    [] Keep Wound Dry in Shower (may purchase a cast cover if needed)     Topical Treatments:  Do not apply lotions, creams, or ointments to wound bed unless directed.     [x] Apply moisturizing lotion A&D ointment to skin surrounding the wound prior to dressing change.  [] Apply   [] Other:      Dressings:           Wound Location : Left posterior leg   Apply Primary Dressing:       [] MediHoney Gel    [] MediHoney Alginate  [] Alginate     [] Alginate with Silver       [] Collagen   [] Collagen with Silver     [] Hydrocolloid  [] Hydrofera Blue Classic (moisten with saline)  [] Hydrofera Blue Ready  [x] Other: Xeroform gauze   [] Pack wound loosely with      Cover and Secure with:    [x] Gauze   [] Radha   [] Kerlix  [] Ace Wrap     [] ABD  [] Medipore tape  [] tape  [x] Other: Drawtex, roll gauze, tape, double tubi  (please make into a sock, tubi is rolling up)    Avoid contact of tape with skin.    Change dressing:   [] Daily      [] Every Other Day   [x] Three times per week, may change as needed for drainage   [] Once a week   [] Do Not Change Dressing     [] Other:     [x] Elevate leg(s)  when sitting.      [x] Avoid prolonged standing in one place.                                         Return

## 2024-10-30 NOTE — PROGRESS NOTES
[facility]   Progress Note and Procedure Note   NO Procedure    Patient Name: Olimpia Hernandez  : 1945  MRN: [id number]    Past Medical History:   Diagnosis Date    Chronic anticoagulation     Chronic atrial fibrillation (HCC)     Chronic pain     CKD (chronic kidney disease), stage III (HCC)     Hyperlipidemia     Hypertension     Lymphedema     Neuropathy      No past surgical history on file.  No family history on file.  Social History     Tobacco Use    Smoking status: Every Day     Current packs/day: 0.50     Types: Cigarettes    Smokeless tobacco: Never   Substance Use Topics    Alcohol use: Not Currently    Drug use: Never     No Known Allergies  Current Outpatient Medications on File Prior to Encounter   Medication Sig Dispense Refill    mupirocin (BACTROBAN) 2 % ointment Apply topically daily Apply topically daily to wound after cleansing.      gentamicin (GARAMYCIN) 0.1 % ointment Apply topically daily Apply topically  daily to wound after cleansing      carvedilol (COREG) 3.125 MG tablet Take 1 tablet by mouth 2 times daily 60 tablet 3    midodrine (PROAMATINE) 5 MG tablet Take 1 tablet by mouth 3 times daily (with meals) 90 tablet 3    gabapentin (NEURONTIN) 100 MG capsule Take 1 capsule by mouth 3 times daily for 10 days. Max Daily Amount: 300 mg 30 capsule 0    acetaminophen (TYLENOL) 325 MG tablet Take 2 tablets by mouth every 6 hours as needed for Pain 120 tablet 3    loratadine (CLARITIN) 10 MG tablet Take 1 tablet by mouth daily      warfarin (COUMADIN) 2 MG tablet Take 1 tablet by mouth Pt states taking 3mg Mon, Wed, Fri and 2mg Tue, Thu, Sat, Sun       No current facility-administered medications on file prior to encounter.     No results found for: \"LABA1C\"      Vitals:    10/30/24 1021   BP: 138/80   Pulse: 92   Resp: 20   Temp: 97.9 °F (36.6 °C)   SpO2: 98%      Wound 24 Pretibial Left (Active)   Number of days: 66       Wound 10/02/24 Leg Left;Posterior #1

## 2024-11-13 ENCOUNTER — HOSPITAL ENCOUNTER (OUTPATIENT)
Facility: HOSPITAL | Age: 79
Discharge: HOME | End: 2024-11-13
Attending: EMERGENCY MEDICINE
Payer: MEDICARE

## 2024-11-13 VITALS
TEMPERATURE: 97 F | SYSTOLIC BLOOD PRESSURE: 149 MMHG | DIASTOLIC BLOOD PRESSURE: 82 MMHG | RESPIRATION RATE: 24 BRPM | HEART RATE: 88 BPM

## 2024-11-13 DIAGNOSIS — S81.802S AVULSION OF SKIN OF LOWER LEG, LEFT, SEQUELA: Primary | ICD-10-CM

## 2024-11-13 PROCEDURE — 11042 DBRDMT SUBQ TIS 1ST 20SQCM/<: CPT

## 2024-11-13 PROCEDURE — 11045 DBRDMT SUBQ TISS EACH ADDL: CPT

## 2024-11-13 RX ORDER — TRIAMCINOLONE ACETONIDE 1 MG/G
OINTMENT TOPICAL ONCE
OUTPATIENT
Start: 2024-11-13 | End: 2024-11-13

## 2024-11-13 RX ORDER — LIDOCAINE 50 MG/G
OINTMENT TOPICAL ONCE
OUTPATIENT
Start: 2024-11-13 | End: 2024-11-13

## 2024-11-13 RX ORDER — SODIUM CHLOR/HYPOCHLOROUS ACID 0.033 %
SOLUTION, IRRIGATION IRRIGATION ONCE
OUTPATIENT
Start: 2024-11-13 | End: 2024-11-13

## 2024-11-13 RX ORDER — LIDOCAINE HYDROCHLORIDE 20 MG/ML
JELLY TOPICAL ONCE
OUTPATIENT
Start: 2024-11-13 | End: 2024-11-13

## 2024-11-13 RX ORDER — BETAMETHASONE DIPROPIONATE 0.5 MG/G
CREAM TOPICAL ONCE
OUTPATIENT
Start: 2024-11-13 | End: 2024-11-13

## 2024-11-13 RX ORDER — MUPIROCIN 20 MG/G
OINTMENT TOPICAL ONCE
OUTPATIENT
Start: 2024-11-13 | End: 2024-11-13

## 2024-11-13 RX ORDER — SILVER SULFADIAZINE 10 MG/G
CREAM TOPICAL ONCE
OUTPATIENT
Start: 2024-11-13 | End: 2024-11-13

## 2024-11-13 RX ORDER — LIDOCAINE HYDROCHLORIDE 40 MG/ML
SOLUTION TOPICAL ONCE
OUTPATIENT
Start: 2024-11-13 | End: 2024-11-13

## 2024-11-13 RX ORDER — NEOMYCIN/BACITRACIN/POLYMYXINB 3.5-400-5K
OINTMENT (GRAM) TOPICAL ONCE
OUTPATIENT
Start: 2024-11-13 | End: 2024-11-13

## 2024-11-13 RX ORDER — LIDOCAINE 40 MG/G
CREAM TOPICAL ONCE
OUTPATIENT
Start: 2024-11-13 | End: 2024-11-13

## 2024-11-13 RX ORDER — GENTAMICIN SULFATE 1 MG/G
OINTMENT TOPICAL ONCE
OUTPATIENT
Start: 2024-11-13 | End: 2024-11-13

## 2024-11-13 RX ORDER — BACITRACIN ZINC AND POLYMYXIN B SULFATE 500; 1000 [USP'U]/G; [USP'U]/G
OINTMENT TOPICAL ONCE
OUTPATIENT
Start: 2024-11-13 | End: 2024-11-13

## 2024-11-13 RX ORDER — GINSENG 100 MG
CAPSULE ORAL ONCE
OUTPATIENT
Start: 2024-11-13 | End: 2024-11-13

## 2024-11-13 RX ORDER — CLOBETASOL PROPIONATE 0.5 MG/G
OINTMENT TOPICAL ONCE
OUTPATIENT
Start: 2024-11-13 | End: 2024-11-13

## 2024-11-13 NOTE — PROGRESS NOTES
[facility]   Progress Note and Procedure Note   NO Procedure    Patient Name: Olimpia Hernandez  : 1945  MRN: [id number]    Past Medical History:   Diagnosis Date    Chronic anticoagulation     Chronic atrial fibrillation (HCC)     Chronic pain     CKD (chronic kidney disease), stage III (HCC)     Hyperlipidemia     Hypertension     Lymphedema     Neuropathy      No past surgical history on file.  No family history on file.  Social History     Tobacco Use    Smoking status: Every Day     Current packs/day: 0.50     Types: Cigarettes    Smokeless tobacco: Never   Substance Use Topics    Alcohol use: Not Currently    Drug use: Never     No Known Allergies  Current Outpatient Medications on File Prior to Encounter   Medication Sig Dispense Refill    mupirocin (BACTROBAN) 2 % ointment Apply topically daily Apply topically daily to wound after cleansing.      gentamicin (GARAMYCIN) 0.1 % ointment Apply topically daily Apply topically  daily to wound after cleansing      carvedilol (COREG) 3.125 MG tablet Take 1 tablet by mouth 2 times daily 60 tablet 3    midodrine (PROAMATINE) 5 MG tablet Take 1 tablet by mouth 3 times daily (with meals) 90 tablet 3    gabapentin (NEURONTIN) 100 MG capsule Take 1 capsule by mouth 3 times daily for 10 days. Max Daily Amount: 300 mg 30 capsule 0    acetaminophen (TYLENOL) 325 MG tablet Take 2 tablets by mouth every 6 hours as needed for Pain 120 tablet 3    loratadine (CLARITIN) 10 MG tablet Take 1 tablet by mouth daily      warfarin (COUMADIN) 2 MG tablet Take 1 tablet by mouth Pt states taking 3mg Mon, Wed, Fri and 2mg Tue, Thu, Sat, Sun       No current facility-administered medications on file prior to encounter.     No results found for: \"LABA1C\"      Vitals:    24 1014   BP: (!) 149/82   Pulse:    Resp:    Temp:       Wound 24 Pretibial Left (Active)   Number of days: 80       Wound 10/02/24 Leg Left;Posterior #1 (Active)   Wound Image

## 2024-11-13 NOTE — FLOWSHEET NOTE
11/13/24 1011   Anesthetic   Anesthetic 4% Lidocaine Liquid Topical   Left Leg Edema Point of Measurement   Leg circumference 34 cm   Ankle circumference 24.3 cm   LLE Neurovascular Assessment   Capillary Refill Less than/Equal to 3 seconds   Color Yellow-Brown/Hemosiderin Staining   Temperature Cold   L Pedal Pulse +2   Wound 10/02/24 Leg Left;Posterior #1   Date First Assessed/Time First Assessed: 10/02/24 0952   Present on Original Admission: Yes  Wound Approximate Age at First Assessment (Weeks): 1.5 weeks  Location: Leg  Wound Location Orientation: Left;Posterior  Wound Description (Comments): #1   Wound Image    Wound Cleansed Cleansed with saline   Wound Length (cm) 9.3 cm   Wound Width (cm) 3.9 cm   Wound Depth (cm) 0.1 cm   Wound Surface Area (cm^2) 36.27 cm^2   Change in Wound Size % (l*w) 85.01   Wound Volume (cm^3) 3.627 cm^3   Wound Healing % 99   Wound Assessment Granulation tissue;Pink/red;Epithelialization;Slough   Drainage Amount Moderate (25-50%)   Drainage Description Serosanguinous   Odor None   Ladonna-wound Assessment Hyperpigmented;Blanchable erythema   Margins Flat/open edges   Wound Thickness Description not for Pressure Injury Full thickness     BP (!) 149/82   Pulse 88   Temp 97 °F (36.1 °C) (Temporal)   Resp 24

## 2024-11-13 NOTE — PATIENT INSTRUCTIONS
Discharge Instructions for  [facility]  [address] [place] [address]  Telephone: [phone]   FAX [phone]    NAME:  Olimpia Hernandez  YOB: 1945  ACCOUNT NUMBER :  [id number]  DATE:  11/13/2024       :AL DORSEY RN     HOME HEALTH:     WOUND SUPPLIES/ DME company : [employer]       Wound Cleansing:   Do not scrub or use excessive force.  Cleanse wound prior to applying a clean dressing with:      [x] Cleanse wound after showering with: Jorge and Jorge baby shampoo lather leave 2-3 min then rinse with water     [] May Shower at Discharge     [x] Shower on days of dressing change, remove dressing first (no [place] spring, dove or ivory)    [] Keep Wound Dry in Shower (may purchase a cast cover if needed)     Topical Treatments:  Do not apply lotions, creams, or ointments to wound bed unless directed.     [x] Apply moisturizing lotion A&D ointment to skin surrounding the wound prior to dressing change.  [] Apply   [] Other:      Dressings:           Wound Location : Left posterior leg   Apply Primary Dressing:       [] MediHoney Gel    [] MediHoney Alginate  [] Alginate     [] Alginate with Silver       [] Collagen   [] Collagen with Silver     [] Hydrocolloid  [] Hydrofera Blue Classic (moisten with saline)  [] Hydrofera Blue Ready  [x] Other: Xeroform gauze   [] Pack wound loosely with      Cover and Secure with:    [x] Gauze   [] Radha   [] Kerlix  [] Ace Wrap     [] ABD  [] Medipore tape  [] tape  [x] Other: Drawtex, roll gauze, tape, double tubi  (please make into a sock, tubi is rolling up)    Avoid contact of tape with skin.    Change dressing:   [] Daily      [] Every Other Day   [x] Three times per week, may change as needed for drainage   [] Once a week   [] Do Not Change Dressing     [] Other:     [x] Elevate leg(s)  when sitting.      [x] Avoid prolonged standing in one place.                                         Return

## 2024-11-14 NOTE — WOUND CARE
Wound Care Supplies      Supply Company:     [employer] [facility] [place]  p:[phone] f: [phone]     Ordering Center:     [facility]  [address] [place] [id number] [phone]  WOUND CARE Dept: [phone]   FAX NUMBER [phone]    Patient Information:      Suellen Gonzales  [address] [facility] [id number] [phone]   : 1945  AGE: 79 y.o.     GENDER: female   EPISODE DATE: 2024    Insurance:      PRIMARY INSURANCE:  Plan: CIGNA PREFERRED MEDICARE HMO  Coverage: CIGNA MEDICARE  Effective Date: 2023  Group Number: [unfilled]  Subscriber Number: [id number] - (Medicare Managed)    Payer/Plan Subscr  Sex Relation Sub. Ins. ID Effective Group Num   1. CIGNA MEDICAR* SUELLEN GONZALES 1945 Female Self [id number]                                   [address]       Patient Wound Information:      Problem List Items Addressed This Visit          Other    Avulsion of skin of lower leg, left, sequela - Primary       WOUNDS REQUIRING DRESSING SUPPLIES:     Wound 24 Pretibial Left (Active)   Number of days: 81       Wound 10/02/24 Leg Left;Posterior #1 (Active)   Wound Image   24 1011   Wound Etiology Traumatic 10/16/24 1020   Wound Cleansed Cleansed with saline 24 1011   Dressing/Treatment Roll gauze;Xeroform 10/30/24 1113   Wound Length (cm) 9.3 cm 24 1011   Wound Width (cm) 3.9 cm 24 1011   Wound Depth (cm) 0.1 cm 24 1011   Wound Surface Area (cm^2) 36.27 cm^2 24 1011   Change in Wound Size % (l*w) 85.01 24 1011   Wound Volume (cm^3) 3.627 cm^3 24 1011   Wound Healing % 99 24 1011   Post-Procedure Length (cm) 9.3 cm 24 1045   Post-Procedure Width (cm) 3.9 cm 24 1045   Post-Procedure Depth (cm) 0.2 cm 24   Post-Procedure Surface Area (cm^2) 36.27 cm^2 245   Post-Procedure Volume (cm^3) 7.254 cm^3 24

## 2024-11-27 ENCOUNTER — HOSPITAL ENCOUNTER (OUTPATIENT)
Facility: HOSPITAL | Age: 79
Discharge: HOME | End: 2024-11-27
Attending: EMERGENCY MEDICINE
Payer: MEDICARE

## 2024-11-27 VITALS
RESPIRATION RATE: 20 BRPM | TEMPERATURE: 97.2 F | DIASTOLIC BLOOD PRESSURE: 92 MMHG | HEART RATE: 86 BPM | SYSTOLIC BLOOD PRESSURE: 165 MMHG

## 2024-11-27 DIAGNOSIS — S81.802S AVULSION OF SKIN OF LOWER LEG, LEFT, SEQUELA: Primary | ICD-10-CM

## 2024-11-27 PROCEDURE — 11042 DBRDMT SUBQ TIS 1ST 20SQCM/<: CPT

## 2024-11-27 RX ORDER — CLOBETASOL PROPIONATE 0.5 MG/G
OINTMENT TOPICAL ONCE
OUTPATIENT
Start: 2024-11-27 | End: 2024-11-27

## 2024-11-27 RX ORDER — GINSENG 100 MG
CAPSULE ORAL ONCE
OUTPATIENT
Start: 2024-11-27 | End: 2024-11-27

## 2024-11-27 RX ORDER — MUPIROCIN 20 MG/G
OINTMENT TOPICAL ONCE
OUTPATIENT
Start: 2024-11-27 | End: 2024-11-27

## 2024-11-27 RX ORDER — BETAMETHASONE DIPROPIONATE 0.5 MG/G
CREAM TOPICAL ONCE
OUTPATIENT
Start: 2024-11-27 | End: 2024-11-27

## 2024-11-27 RX ORDER — LIDOCAINE HYDROCHLORIDE 20 MG/ML
JELLY TOPICAL ONCE
OUTPATIENT
Start: 2024-11-27 | End: 2024-11-27

## 2024-11-27 RX ORDER — LIDOCAINE 50 MG/G
OINTMENT TOPICAL ONCE
OUTPATIENT
Start: 2024-11-27 | End: 2024-11-27

## 2024-11-27 RX ORDER — LIDOCAINE HYDROCHLORIDE 40 MG/ML
SOLUTION TOPICAL ONCE
OUTPATIENT
Start: 2024-11-27 | End: 2024-11-27

## 2024-11-27 RX ORDER — SILVER SULFADIAZINE 10 MG/G
CREAM TOPICAL ONCE
OUTPATIENT
Start: 2024-11-27 | End: 2024-11-27

## 2024-11-27 RX ORDER — NEOMYCIN/BACITRACIN/POLYMYXINB 3.5-400-5K
OINTMENT (GRAM) TOPICAL ONCE
OUTPATIENT
Start: 2024-11-27 | End: 2024-11-27

## 2024-11-27 RX ORDER — GENTAMICIN SULFATE 1 MG/G
OINTMENT TOPICAL ONCE
OUTPATIENT
Start: 2024-11-27 | End: 2024-11-27

## 2024-11-27 RX ORDER — BACITRACIN ZINC AND POLYMYXIN B SULFATE 500; 1000 [USP'U]/G; [USP'U]/G
OINTMENT TOPICAL ONCE
OUTPATIENT
Start: 2024-11-27 | End: 2024-11-27

## 2024-11-27 RX ORDER — SODIUM CHLOR/HYPOCHLOROUS ACID 0.033 %
SOLUTION, IRRIGATION IRRIGATION ONCE
OUTPATIENT
Start: 2024-11-27 | End: 2024-11-27

## 2024-11-27 RX ORDER — LIDOCAINE 40 MG/G
CREAM TOPICAL ONCE
OUTPATIENT
Start: 2024-11-27 | End: 2024-11-27

## 2024-11-27 RX ORDER — TRIAMCINOLONE ACETONIDE 1 MG/G
OINTMENT TOPICAL ONCE
OUTPATIENT
Start: 2024-11-27 | End: 2024-11-27

## 2024-11-27 NOTE — PATIENT INSTRUCTIONS
Appointment:    [x] Return Appointment: With Dr. Shreyas Worthington  in 2 Week(s)        Wound Care Center Information: Should you experience any significant changes in your wound(s) or have questions about your wound care, please contact the [facility] at MONDAY-THURSDAY 8:00 am - 4:30 AND Friday 8:00 AM- 12:00 PM .  If you need help with your wound outside these hours and cannot wait until we are again available, contact your PCP or go to the hospital emergency room.     PLEASE NOTE: IF YOU ARE UNABLE TO OBTAIN WOUND SUPPLIES, CONTINUE TO USE THE SUPPLIES YOU HAVE AVAILABLE UNTIL YOU ARE ABLE TO REACH US. IT IS MOST IMPORTANT TO KEEP THE WOUND COVERED AT ALL TIMES.     Physician Signature:_______________________ Dr. Shreyas Worthington

## 2024-11-27 NOTE — FLOWSHEET NOTE
11/27/24 1104   Left Leg Edema Point of Measurement   Compression Therapy Tubular elastic support bandage   Wound 10/02/24 Leg Left;Posterior #1 clustered   Date First Assessed/Time First Assessed: 10/02/24 0952   Present on Original Admission: Yes  Wound Approximate Age at First Assessment (Weeks): 1.5 weeks  Location: Leg  Wound Location Orientation: Left;Posterior  Wound Description (Comments): #1 clus...   Dressing/Treatment Xeroform;Gauze dressing/dressing sponge;ABD;Roll gauze;Tape/Soft cloth adhesive tape     Discharge Condition: Stable    Pain: 0    Ambulatory Status:Walking    Discharge Destination: Home    Transportation:Car    Accompanied by: Self and Family    Discharge instructions reviewed with Self and Family and copy or written instructions have been provided. All questions/concerns have been addressed at this time.

## 2024-11-27 NOTE — PROGRESS NOTES
Left;Posterior #1 clustered (Active)   Wound Image   11/27/24 1016   Wound Etiology Traumatic 10/16/24 1020   Wound Cleansed Cleansed with saline 11/27/24 1016   Dressing/Treatment Xeroform;Gauze dressing/dressing sponge;ABD;Roll gauze;Tape/Soft cloth adhesive tape 11/27/24 1104   Wound Length (cm) 7.4 cm 11/27/24 1016   Wound Width (cm) 2.1 cm 11/27/24 1016   Wound Depth (cm) 0.1 cm 11/27/24 1016   Wound Surface Area (cm^2) 15.54 cm^2 11/27/24 1016   Change in Wound Size % (l*w) 93.58 11/27/24 1016   Wound Volume (cm^3) 1.554 cm^3 11/27/24 1016   Wound Healing % 100 11/27/24 1016   Post-Procedure Length (cm) 7.4 cm 11/27/24 1059   Post-Procedure Width (cm) 2.1 cm 11/27/24 1059   Post-Procedure Depth (cm) 0.2 cm 11/27/24 1059   Post-Procedure Surface Area (cm^2) 15.54 cm^2 11/27/24 1059   Post-Procedure Volume (cm^3) 3.108 cm^3 11/27/24 1059   Distance Tunneling (cm) 3 cm 10/02/24 1024   Tunneling Position ___ O'Clock 3 10/02/24 1024   Wound Assessment Pink/red;Slough 11/27/24 1016   Drainage Amount Moderate (25-50%) 11/27/24 1016   Drainage Description Serosanguinous 11/27/24 1016   Odor None 11/27/24 1016   Ladonna-wound Assessment Hyperpigmented;Blanchable erythema 11/27/24 1016   Margins Epibole (rolled edges);Flat/open edges 11/27/24 1016   Wound Thickness Description not for Pressure Injury Partial thickness 11/27/24 1016   Number of days: 56               I have noted, and reviewed today's data for this patient in [facility] and concur with same. The focused physical exam, other physical findings, Medical history, Review of Symptoms and Medications today remains unchanged except as noted below.     Patient notes today: doing better.  Lesion/Wound, focused exam on Presentation today: LLE medial calf ulcer much smaller with two remaining open areas, thin slough over  all open surfaces pink edges.    Procedure:   Ulcer Type: traumatic  Consent obtained: Yes  Time out taken: Yes    Wound,(s)# LLE.  Procedure name:

## 2024-11-27 NOTE — FLOWSHEET NOTE
11/27/24 1016   Anesthetic   Anesthetic 4% Lidocaine Liquid Topical   Left Leg Edema Point of Measurement   Leg circumference 35.4 cm   Ankle circumference 23.9 cm   LLE Neurovascular Assessment   Capillary Refill Less than/Equal to 3 seconds   Color Yellow-Brown/Hemosiderin Staining   Temperature Cold   L Pedal Pulse +2   Wound 10/02/24 Leg Left;Posterior #1 clustered   Date First Assessed/Time First Assessed: 10/02/24 0952   Present on Original Admission: Yes  Wound Approximate Age at First Assessment (Weeks): 1.5 weeks  Location: Leg  Wound Location Orientation: Left;Posterior  Wound Description (Comments): #1 clus...   Wound Image    Wound Cleansed Cleansed with saline   Wound Length (cm) 7.4 cm   Wound Width (cm) 2.1 cm   Wound Depth (cm) 0.1 cm   Wound Surface Area (cm^2) 15.54 cm^2   Change in Wound Size % (l*w) 93.58   Wound Volume (cm^3) 1.554 cm^3   Wound Healing % 100   Wound Assessment [place]/red;Slough   Drainage Amount Moderate (25-50%)   Drainage Description Serosanguinous   Odor None   Ladonna-wound Assessment Hyperpigmented;Blanchable erythema   Margins Epibole (rolled edges);Flat/open edges   Wound Thickness Description not for Pressure Injury Partial thickness   Pain Assessment   Pain Assessment None - Denies Pain     BP (!) 165/92   Pulse 86   Temp 97.2 °F (36.2 °C) (Temporal)   Resp 20

## 2025-04-07 ENCOUNTER — APPOINTMENT (OUTPATIENT)
Facility: HOSPITAL | Age: 80
DRG: 194 | End: 2025-04-07
Payer: MEDICARE

## 2025-04-07 ENCOUNTER — HOSPITAL ENCOUNTER (INPATIENT)
Facility: HOSPITAL | Age: 80
LOS: 3 days | Discharge: HOME | DRG: 194 | End: 2025-04-10
Attending: EMERGENCY MEDICINE | Admitting: HOSPITALIST
Payer: MEDICARE

## 2025-04-07 DIAGNOSIS — J18.9 PNEUMONIA OF LEFT LOWER LOBE DUE TO INFECTIOUS ORGANISM: Primary | ICD-10-CM

## 2025-04-07 DIAGNOSIS — R59.0 MEDIASTINAL LYMPHADENOPATHY: ICD-10-CM

## 2025-04-07 DIAGNOSIS — R06.02 SHORTNESS OF BREATH: ICD-10-CM

## 2025-04-07 LAB
ALBUMIN SERPL-MCNC: 2.8 G/DL (ref 3.5–5)
ALBUMIN/GLOB SERPL: 0.7 (ref 1.1–2.2)
ALP SERPL-CCNC: 122 U/L (ref 45–117)
ALT SERPL-CCNC: 48 U/L (ref 12–78)
ANION GAP SERPL CALC-SCNC: 6 MMOL/L (ref 2–12)
AST SERPL-CCNC: 39 U/L (ref 15–37)
BASOPHILS # BLD: 0.03 K/UL (ref 0–0.1)
BASOPHILS NFR BLD: 0.3 % (ref 0–1)
BILIRUB SERPL-MCNC: 1.8 MG/DL (ref 0.2–1)
BUN SERPL-MCNC: 21 MG/DL (ref 6–20)
BUN/CREAT SERPL: 22 (ref 12–20)
CALCIUM SERPL-MCNC: 9.4 MG/DL (ref 8.5–10.1)
CHLORIDE SERPL-SCNC: 107 MMOL/L (ref 97–108)
CO2 SERPL-SCNC: 24 MMOL/L (ref 21–32)
COMMENT:: NORMAL
CREAT SERPL-MCNC: 0.96 MG/DL (ref 0.55–1.02)
DIFFERENTIAL METHOD BLD: ABNORMAL
EKG DIAGNOSIS: NORMAL
EKG Q-T INTERVAL: 302 MS
EKG QRS DURATION: 66 MS
EKG QTC CALCULATION (BAZETT): 387 MS
EKG R AXIS: 85 DEGREES
EKG T AXIS: 182 DEGREES
EKG VENTRICULAR RATE: 99 BPM
EOSINOPHIL # BLD: 0.06 K/UL (ref 0–0.4)
EOSINOPHIL NFR BLD: 0.6 % (ref 0–7)
ERYTHROCYTE [DISTWIDTH] IN BLOOD BY AUTOMATED COUNT: 14.8 % (ref 11.5–14.5)
GLOBULIN SER CALC-MCNC: 4.3 G/DL (ref 2–4)
GLUCOSE SERPL-MCNC: 132 MG/DL (ref 65–100)
HCT VFR BLD AUTO: 43.3 % (ref 35–47)
HGB BLD-MCNC: 14.2 G/DL (ref 11.5–16)
IMM GRANULOCYTES # BLD AUTO: 0.07 K/UL (ref 0–0.04)
IMM GRANULOCYTES NFR BLD AUTO: 0.7 % (ref 0–0.5)
INR PPP: 4.2 (ref 0.9–1.1)
LYMPHOCYTES # BLD: 1.03 K/UL (ref 0.8–3.5)
LYMPHOCYTES NFR BLD: 9.8 % (ref 12–49)
MAGNESIUM SERPL-MCNC: 2 MG/DL (ref 1.6–2.4)
MCH RBC QN AUTO: 28.2 PG (ref 26–34)
MCHC RBC AUTO-ENTMCNC: 32.8 G/DL (ref 30–36.5)
MCV RBC AUTO: 86.1 FL (ref 80–99)
MONOCYTES # BLD: 1.31 K/UL (ref 0–1)
MONOCYTES NFR BLD: 12.5 % (ref 5–13)
NEUTS SEG # BLD: 7.99 K/UL (ref 1.8–8)
NEUTS SEG NFR BLD: 76.1 % (ref 32–75)
NRBC # BLD: 0 K/UL (ref 0–0.01)
NRBC BLD-RTO: 0 PER 100 WBC
NT PRO BNP: 3713 PG/ML
PLATELET # BLD AUTO: 189 K/UL (ref 150–400)
PMV BLD AUTO: 10.8 FL (ref 8.9–12.9)
POTASSIUM SERPL-SCNC: 3.8 MMOL/L (ref 3.5–5.1)
PROT SERPL-MCNC: 7.1 G/DL (ref 6.4–8.2)
PROTHROMBIN TIME: 39.9 SEC (ref 9.2–11.2)
RBC # BLD AUTO: 5.03 M/UL (ref 3.8–5.2)
SODIUM SERPL-SCNC: 137 MMOL/L (ref 136–145)
SPECIMEN HOLD: NORMAL
TROPONIN I SERPL HS-MCNC: 7 NG/L (ref 0–51)
WBC # BLD AUTO: 10.5 K/UL (ref 3.6–11)

## 2025-04-07 PROCEDURE — 83735 ASSAY OF MAGNESIUM: CPT

## 2025-04-07 PROCEDURE — 36415 COLL VENOUS BLD VENIPUNCTURE: CPT

## 2025-04-07 PROCEDURE — 87070 CULTURE OTHR SPECIMN AEROBIC: CPT

## 2025-04-07 PROCEDURE — 6360000004 HC RX CONTRAST MEDICATION: Performed by: EMERGENCY MEDICINE

## 2025-04-07 PROCEDURE — 2060000000 HC ICU INTERMEDIATE R&B

## 2025-04-07 PROCEDURE — 87205 SMEAR GRAM STAIN: CPT

## 2025-04-07 PROCEDURE — 2580000003 HC RX 258: Performed by: EMERGENCY MEDICINE

## 2025-04-07 PROCEDURE — 80053 COMPREHEN METABOLIC PANEL: CPT

## 2025-04-07 PROCEDURE — 96374 THER/PROPH/DIAG INJ IV PUSH: CPT

## 2025-04-07 PROCEDURE — 2500000003 HC RX 250 WO HCPCS: Performed by: EMERGENCY MEDICINE

## 2025-04-07 PROCEDURE — 93010 ELECTROCARDIOGRAM REPORT: CPT | Performed by: SPECIALIST

## 2025-04-07 PROCEDURE — 6370000000 HC RX 637 (ALT 250 FOR IP): Performed by: HOSPITALIST

## 2025-04-07 PROCEDURE — 99285 EMERGENCY DEPT VISIT HI MDM: CPT

## 2025-04-07 PROCEDURE — 71275 CT ANGIOGRAPHY CHEST: CPT

## 2025-04-07 PROCEDURE — 84484 ASSAY OF TROPONIN QUANT: CPT

## 2025-04-07 PROCEDURE — 87636 SARSCOV2 & INF A&B AMP PRB: CPT

## 2025-04-07 PROCEDURE — 85025 COMPLETE CBC W/AUTO DIFF WBC: CPT

## 2025-04-07 PROCEDURE — 6360000002 HC RX W HCPCS: Performed by: EMERGENCY MEDICINE

## 2025-04-07 PROCEDURE — 71046 X-RAY EXAM CHEST 2 VIEWS: CPT

## 2025-04-07 PROCEDURE — 93005 ELECTROCARDIOGRAM TRACING: CPT | Performed by: EMERGENCY MEDICINE

## 2025-04-07 PROCEDURE — 94761 N-INVAS EAR/PLS OXIMETRY MLT: CPT

## 2025-04-07 PROCEDURE — 83880 ASSAY OF NATRIURETIC PEPTIDE: CPT

## 2025-04-07 PROCEDURE — 85610 PROTHROMBIN TIME: CPT

## 2025-04-07 PROCEDURE — 96375 TX/PRO/DX INJ NEW DRUG ADDON: CPT

## 2025-04-07 PROCEDURE — 6370000000 HC RX 637 (ALT 250 FOR IP): Performed by: EMERGENCY MEDICINE

## 2025-04-07 RX ORDER — IPRATROPIUM BROMIDE AND ALBUTEROL SULFATE 2.5; .5 MG/3ML; MG/3ML
1 SOLUTION RESPIRATORY (INHALATION)
Status: COMPLETED | OUTPATIENT
Start: 2025-04-07 | End: 2025-04-07

## 2025-04-07 RX ORDER — CARVEDILOL 6.25 MG/1
6.25 TABLET ORAL 2 TIMES DAILY
Status: DISCONTINUED | OUTPATIENT
Start: 2025-04-07 | End: 2025-04-10 | Stop reason: HOSPADM

## 2025-04-07 RX ORDER — GUAIFENESIN 600 MG/1
600 TABLET, EXTENDED RELEASE ORAL 2 TIMES DAILY
Status: DISCONTINUED | OUTPATIENT
Start: 2025-04-07 | End: 2025-04-10 | Stop reason: HOSPADM

## 2025-04-07 RX ORDER — IPRATROPIUM BROMIDE AND ALBUTEROL SULFATE 2.5; .5 MG/3ML; MG/3ML
1 SOLUTION RESPIRATORY (INHALATION)
Status: DISCONTINUED | OUTPATIENT
Start: 2025-04-07 | End: 2025-04-08

## 2025-04-07 RX ORDER — CETIRIZINE HYDROCHLORIDE 10 MG/1
10 TABLET ORAL DAILY
Status: DISCONTINUED | OUTPATIENT
Start: 2025-04-07 | End: 2025-04-10 | Stop reason: HOSPADM

## 2025-04-07 RX ORDER — LISINOPRIL 5 MG/1
5 TABLET ORAL DAILY
Status: DISCONTINUED | OUTPATIENT
Start: 2025-04-07 | End: 2025-04-10 | Stop reason: HOSPADM

## 2025-04-07 RX ORDER — CARVEDILOL 3.12 MG/1
3.12 TABLET ORAL 2 TIMES DAILY
Status: DISCONTINUED | OUTPATIENT
Start: 2025-04-07 | End: 2025-04-07

## 2025-04-07 RX ORDER — 0.9 % SODIUM CHLORIDE 0.9 %
1000 INTRAVENOUS SOLUTION INTRAVENOUS ONCE
Status: COMPLETED | OUTPATIENT
Start: 2025-04-07 | End: 2025-04-07

## 2025-04-07 RX ORDER — IOPAMIDOL 755 MG/ML
100 INJECTION, SOLUTION INTRAVASCULAR
Status: COMPLETED | OUTPATIENT
Start: 2025-04-07 | End: 2025-04-07

## 2025-04-07 RX ADMIN — CARVEDILOL 6.25 MG: 6.25 TABLET, FILM COATED ORAL at 23:10

## 2025-04-07 RX ADMIN — LISINOPRIL 5 MG: 5 TABLET ORAL at 19:09

## 2025-04-07 RX ADMIN — SODIUM CHLORIDE 1000 ML: 0.9 INJECTION, SOLUTION INTRAVENOUS at 18:41

## 2025-04-07 RX ADMIN — WATER 2000 MG: 1 INJECTION INTRAMUSCULAR; INTRAVENOUS; SUBCUTANEOUS at 18:30

## 2025-04-07 RX ADMIN — AZITHROMYCIN MONOHYDRATE 500 MG: 500 INJECTION, POWDER, LYOPHILIZED, FOR SOLUTION INTRAVENOUS at 18:35

## 2025-04-07 RX ADMIN — IPRATROPIUM BROMIDE AND ALBUTEROL SULFATE 1 DOSE: .5; 3 SOLUTION RESPIRATORY (INHALATION) at 16:25

## 2025-04-07 RX ADMIN — CETIRIZINE HYDROCHLORIDE 10 MG: 10 TABLET, FILM COATED ORAL at 19:09

## 2025-04-07 RX ADMIN — IOPAMIDOL 100 ML: 755 INJECTION, SOLUTION INTRAVENOUS at 16:00

## 2025-04-07 RX ADMIN — GUAIFENESIN 600 MG: 600 TABLET ORAL at 23:31

## 2025-04-07 ASSESSMENT — ENCOUNTER SYMPTOMS
COUGH: 1
VOMITING: 0
SHORTNESS OF BREATH: 1
CHEST TIGHTNESS: 1

## 2025-04-07 ASSESSMENT — PAIN - FUNCTIONAL ASSESSMENT: PAIN_FUNCTIONAL_ASSESSMENT: NONE - DENIES PAIN

## 2025-04-07 NOTE — ED TRIAGE NOTES
Patient to ER triage for complaints of SOB.     Reports she drove recently to [place].     EKG completed prior to triage.     Denies chest pain.

## 2025-04-07 NOTE — ED PROVIDER NOTES
with  clinical presentation.  3. Mediastinal and bilateral hilar lymphadenopathy.   [BN]   1800 I reviewed imaging results with the patient.  She has signs of pneumonia on CT scan.  Will give her IV antibiotics.  There is also some concern imaging findings could be related to malignancy in the setting of enlarged lymph node in the chest.  And smoking history.  Regarding disposition she is still a little tachycardic and mildly tachypneic.  Will admit her for IV antibiotics and anticipate a pulmonary consult.  Discussed case with the hospitalist who is agreeable to admit the patient. [BN]      ED Course User Index  [AS] Nas Grace MD  [BN] Valentín Quick MD         CONSULTS:  None    PROCEDURES:     Procedures        (Please note that portions of this note were completed with a voice recognition program.  Efforts were made to edit the dictations but occasionally words are mis-transcribed.)    Valentín Quick MD (electronically signed)  Emergency Attending Physician              Valentín Quick MD  04/07/25 0224

## 2025-04-08 LAB
ALBUMIN SERPL-MCNC: 2.4 G/DL (ref 3.5–5)
ALBUMIN/GLOB SERPL: 0.8 (ref 1.1–2.2)
ALP SERPL-CCNC: 106 U/L (ref 45–117)
ALT SERPL-CCNC: 50 U/L (ref 12–78)
ANION GAP SERPL CALC-SCNC: 6 MMOL/L (ref 2–12)
AST SERPL-CCNC: 40 U/L (ref 15–37)
B PERT DNA SPEC QL NAA+PROBE: NOT DETECTED
BASOPHILS # BLD: 0.02 K/UL (ref 0–0.1)
BASOPHILS NFR BLD: 0.2 % (ref 0–1)
BILIRUB SERPL-MCNC: 0.9 MG/DL (ref 0.2–1)
BORDETELLA PARAPERTUSSIS BY PCR: NOT DETECTED
BUN SERPL-MCNC: 19 MG/DL (ref 6–20)
BUN/CREAT SERPL: 23 (ref 12–20)
C PNEUM DNA SPEC QL NAA+PROBE: NOT DETECTED
CALCIUM SERPL-MCNC: 8.8 MG/DL (ref 8.5–10.1)
CHLORIDE SERPL-SCNC: 108 MMOL/L (ref 97–108)
CO2 SERPL-SCNC: 25 MMOL/L (ref 21–32)
CREAT SERPL-MCNC: 0.82 MG/DL (ref 0.55–1.02)
DIFFERENTIAL METHOD BLD: ABNORMAL
EOSINOPHIL # BLD: 0.15 K/UL (ref 0–0.4)
EOSINOPHIL NFR BLD: 1.3 % (ref 0–7)
ERYTHROCYTE [DISTWIDTH] IN BLOOD BY AUTOMATED COUNT: 14.9 % (ref 11.5–14.5)
FLUAV RNA SPEC QL NAA+PROBE: NOT DETECTED
FLUAV SUBTYP SPEC NAA+PROBE: NOT DETECTED
FLUBV RNA SPEC QL NAA+PROBE: NOT DETECTED
FLUBV RNA SPEC QL NAA+PROBE: NOT DETECTED
GLOBULIN SER CALC-MCNC: 3.1 G/DL (ref 2–4)
GLUCOSE SERPL-MCNC: 121 MG/DL (ref 65–100)
HADV DNA SPEC QL NAA+PROBE: NOT DETECTED
HCOV 229E RNA SPEC QL NAA+PROBE: NOT DETECTED
HCOV HKU1 RNA SPEC QL NAA+PROBE: NOT DETECTED
HCOV NL63 RNA SPEC QL NAA+PROBE: NOT DETECTED
HCOV OC43 RNA SPEC QL NAA+PROBE: NOT DETECTED
HCT VFR BLD AUTO: 39.6 % (ref 35–47)
HGB BLD-MCNC: 13.1 G/DL (ref 11.5–16)
HMPV RNA SPEC QL NAA+PROBE: NOT DETECTED
HPIV1 RNA SPEC QL NAA+PROBE: NOT DETECTED
HPIV2 RNA SPEC QL NAA+PROBE: NOT DETECTED
HPIV3 RNA SPEC QL NAA+PROBE: NOT DETECTED
HPIV4 RNA SPEC QL NAA+PROBE: NOT DETECTED
IMM GRANULOCYTES # BLD AUTO: 0.09 K/UL (ref 0–0.04)
IMM GRANULOCYTES NFR BLD AUTO: 0.8 % (ref 0–0.5)
INR PPP: 3.8 (ref 0.9–1.1)
LYMPHOCYTES # BLD: 1.25 K/UL (ref 0.8–3.5)
LYMPHOCYTES NFR BLD: 11.2 % (ref 12–49)
M PNEUMO DNA SPEC QL NAA+PROBE: NOT DETECTED
MCH RBC QN AUTO: 28.2 PG (ref 26–34)
MCHC RBC AUTO-ENTMCNC: 33.1 G/DL (ref 30–36.5)
MCV RBC AUTO: 85.3 FL (ref 80–99)
MONOCYTES # BLD: 1.54 K/UL (ref 0–1)
MONOCYTES NFR BLD: 13.8 % (ref 5–13)
NEUTS SEG # BLD: 8.14 K/UL (ref 1.8–8)
NEUTS SEG NFR BLD: 72.7 % (ref 32–75)
NRBC # BLD: 0 K/UL (ref 0–0.01)
NRBC BLD-RTO: 0 PER 100 WBC
PLATELET # BLD AUTO: 165 K/UL (ref 150–400)
PMV BLD AUTO: 11.2 FL (ref 8.9–12.9)
POTASSIUM SERPL-SCNC: 4.2 MMOL/L (ref 3.5–5.1)
PROT SERPL-MCNC: 5.5 G/DL (ref 6.4–8.2)
PROTHROMBIN TIME: 36.6 SEC (ref 9.2–11.2)
RBC # BLD AUTO: 4.64 M/UL (ref 3.8–5.2)
RSV RNA SPEC QL NAA+PROBE: NOT DETECTED
RV+EV RNA SPEC QL NAA+PROBE: NOT DETECTED
SARS-COV-2 RNA RESP QL NAA+PROBE: NOT DETECTED
SARS-COV-2 RNA RESP QL NAA+PROBE: NOT DETECTED
SODIUM SERPL-SCNC: 139 MMOL/L (ref 136–145)
SOURCE: NORMAL
WBC # BLD AUTO: 11.2 K/UL (ref 3.6–11)

## 2025-04-08 PROCEDURE — 80053 COMPREHEN METABOLIC PANEL: CPT

## 2025-04-08 PROCEDURE — 97116 GAIT TRAINING THERAPY: CPT

## 2025-04-08 PROCEDURE — 94761 N-INVAS EAR/PLS OXIMETRY MLT: CPT

## 2025-04-08 PROCEDURE — 97165 OT EVAL LOW COMPLEX 30 MIN: CPT

## 2025-04-08 PROCEDURE — 97161 PT EVAL LOW COMPLEX 20 MIN: CPT

## 2025-04-08 PROCEDURE — 6370000000 HC RX 637 (ALT 250 FOR IP): Performed by: INTERNAL MEDICINE

## 2025-04-08 PROCEDURE — 85025 COMPLETE CBC W/AUTO DIFF WBC: CPT

## 2025-04-08 PROCEDURE — 85610 PROTHROMBIN TIME: CPT

## 2025-04-08 PROCEDURE — 2500000003 HC RX 250 WO HCPCS: Performed by: INTERNAL MEDICINE

## 2025-04-08 PROCEDURE — 0202U NFCT DS 22 TRGT SARS-COV-2: CPT

## 2025-04-08 PROCEDURE — 6370000000 HC RX 637 (ALT 250 FOR IP): Performed by: HOSPITALIST

## 2025-04-08 PROCEDURE — 6360000002 HC RX W HCPCS: Performed by: INTERNAL MEDICINE

## 2025-04-08 PROCEDURE — 97530 THERAPEUTIC ACTIVITIES: CPT

## 2025-04-08 PROCEDURE — 2580000003 HC RX 258: Performed by: INTERNAL MEDICINE

## 2025-04-08 PROCEDURE — 97535 SELF CARE MNGMENT TRAINING: CPT

## 2025-04-08 PROCEDURE — 1100000000 HC RM PRIVATE

## 2025-04-08 PROCEDURE — 94640 AIRWAY INHALATION TREATMENT: CPT

## 2025-04-08 RX ORDER — NICOTINE 21 MG/24HR
1 PATCH, TRANSDERMAL 24 HOURS TRANSDERMAL DAILY
Status: DISCONTINUED | OUTPATIENT
Start: 2025-04-08 | End: 2025-04-10 | Stop reason: HOSPADM

## 2025-04-08 RX ORDER — IPRATROPIUM BROMIDE AND ALBUTEROL SULFATE 2.5; .5 MG/3ML; MG/3ML
1 SOLUTION RESPIRATORY (INHALATION)
Status: DISCONTINUED | OUTPATIENT
Start: 2025-04-08 | End: 2025-04-10

## 2025-04-08 RX ORDER — ACETAMINOPHEN 325 MG/1
650 TABLET ORAL EVERY 6 HOURS PRN
Status: DISCONTINUED | OUTPATIENT
Start: 2025-04-08 | End: 2025-04-10 | Stop reason: HOSPADM

## 2025-04-08 RX ADMIN — WATER 1000 MG: 1 INJECTION INTRAMUSCULAR; INTRAVENOUS; SUBCUTANEOUS at 17:56

## 2025-04-08 RX ADMIN — GUAIFENESIN 600 MG: 600 TABLET ORAL at 09:52

## 2025-04-08 RX ADMIN — IPRATROPIUM BROMIDE AND ALBUTEROL SULFATE 1 DOSE: .5; 3 SOLUTION RESPIRATORY (INHALATION) at 07:44

## 2025-04-08 RX ADMIN — CETIRIZINE HYDROCHLORIDE 10 MG: 10 TABLET, FILM COATED ORAL at 09:52

## 2025-04-08 RX ADMIN — GUAIFENESIN 600 MG: 600 TABLET ORAL at 23:00

## 2025-04-08 RX ADMIN — ACETAMINOPHEN 650 MG: 325 TABLET ORAL at 11:36

## 2025-04-08 RX ADMIN — CARVEDILOL 6.25 MG: 6.25 TABLET, FILM COATED ORAL at 23:00

## 2025-04-08 RX ADMIN — CARVEDILOL 6.25 MG: 6.25 TABLET, FILM COATED ORAL at 09:52

## 2025-04-08 RX ADMIN — AZITHROMYCIN MONOHYDRATE 500 MG: 500 INJECTION, POWDER, LYOPHILIZED, FOR SOLUTION INTRAVENOUS at 18:07

## 2025-04-08 RX ADMIN — LISINOPRIL 5 MG: 5 TABLET ORAL at 09:52

## 2025-04-08 ASSESSMENT — PAIN SCALES - GENERAL
PAINLEVEL_OUTOF10: 5
PAINLEVEL_OUTOF10: 0

## 2025-04-08 ASSESSMENT — PAIN DESCRIPTION - LOCATION: LOCATION: HEAD

## 2025-04-08 NOTE — ED NOTES
TRANSFER - OUT REPORT:    Verbal report given to Ludwin MOY on Olimpia Hernandez  being transferred to [facility] for routine progression of patient care       Report consisted of patient's Situation, Background, Assessment and   Recommendations(SBAR).     Information from the following report(s) Nurse Handoff Report, Index, ED SBAR, Surgery Report, MAR, and Recent Results was reviewed with the receiving nurse.    [place] Fall Assessment:    Presents to emergency department  because of falls (Syncope, seizure, or loss of consciousness): No  Age > 70: Yes  Altered Mental Status, Intoxication with alcohol or substance confusion (Disorientation, impaired judgment, poor safety awaremess, or inability to follow instructions): No  Impaired Mobility: Ambulates or transfers with assistive devices or assistance; Unable to ambulate or transer.: Yes  Nursing Judgement: Yes          Lines:   Peripheral IV 04/07/25 Right Antecubital (Active)   Site Assessment Clean, dry & intact 04/07/25 1355   Line Status Blood return noted 04/07/25 1355   Phlebitis Assessment No symptoms 04/07/25 1355   Infiltration Assessment 0 04/07/25 1355   Dressing Status New dressing applied;Clean, dry & intact 04/07/25 1355   Dressing Type Transparent 04/07/25 1355   Dressing Intervention New 04/07/25 1355        Opportunity for questions and clarification was provided.      Patient transported with:  Registered Nurse

## 2025-04-08 NOTE — CONSULTS
Hypertension     Lymphedema     Neuropathy        No past surgical history on file.    No family history on file.    Social History     Tobacco Use    Smoking status: Every Day     Current packs/day: 0.50     Types: Cigarettes    Smokeless tobacco: Never   Substance Use Topics    Alcohol use: Not Currently       No Known Allergies    Current Facility-Administered Medications   Medication Dose Route Frequency    ipratropium 0.5 mg-albuterol 2.5 mg (DUONEB) nebulizer solution 1 Dose  1 Dose Inhalation Q6H WA RT    cetirizine (ZYRTEC) tablet 10 mg  10 mg Oral Daily    lisinopril (PRINIVIL;ZESTRIL) tablet 5 mg  5 mg Oral Daily    azithromycin (ZITHROMAX) 500 mg in sodium chloride 0.9 % 250 mL IVPB ([id number])  500 mg IntraVENous Q24H    guaiFENesin (MUCINEX) extended release tablet 600 mg  600 mg Oral BID    carvedilol (COREG) tablet 6.25 mg  6.25 mg Oral BID    cefTRIAXone (ROCEPHIN) 1,000 mg in sterile water 10 mL IV syringe  1,000 mg IntraVENous Q24H    Warfarin - Pharmacy to Dose   Oral RX Placeholder    Warfarin - Please hold dose on 4/7 due to high INR. Thanks!   Oral Once         REVIEW OF SYSTEMS   12 point ROS negative except as stated in the HPI.      Physical Exam:   Vitals:    04/08/25 0815   BP: (!) 146/82   Pulse: 87   Resp: 16   Temp: 98.2 °F (36.8 °C)   SpO2: 93%       General:  Alert, cooperative, no distress, appears stated age.   Head:  Normocephalic, without obvious abnormality, atraumatic.   Eyes:  Conjunctivae/corneas clear.    Nose: Nares normal. Septum midline.   Throat: Lips, mucosa, and tongue normal.    Neck: Supple, symmetrical, trachea midline   Lungs:   Scattered rhonchi, good air movement   Chest wall:  No tenderness or deformity.   Heart:  Regular rate and rhythm, S1, S2 normal, no murmur, click, rub or gallop.   Abdomen:   Soft, non-tender. Bowel sounds normal.   Extremities: Extremities normal, atraumatic, no cyanosis or edema.   Pulses: 2+ and symmetric all extremities.   Skin: Skin

## 2025-04-08 NOTE — CARE COORDINATION
Care Management Initial Assessment  4/8/2025 11:45 AM  If patient is discharged prior to next notation, then this note serves as note for discharge by case management.    Reason for Admission:   Shortness of breath [R06.02]  Mediastinal lymphadenopathy [R59.0]  Pneumonia of left lower lobe due to infectious organism [J18.9]  Pneumonia due to infectious organism [J18.9]         Patient Admission Status: Inpatient  Date Admitted to INP: 4/7/25  RUR: Readmission Risk Score: 13.5    Hospitalization in the last 30 days (Readmission):  No        Advance Care Planning:  Code Status: Full Code  Primary Healthcare Decision Maker: (P) Legal Next of Kin   Advance Directive: has NO advanced directive - not interested in additional information     __________________________________________________________________________  Assessment:      04/08/25 1142   Service Assessment   Patient Orientation Alert and Oriented   Cognition Alert   History Provided By Patient   Primary Caregiver Self   Accompanied By/Relationship daughter [employer] Children;Family Members   Patient's Healthcare Decision Maker is: Legal Next of Kin   PCP Verified by CM Yes   Last Visit to PCP Within last 6 months   Prior Functional Level Independent in ADLs/IADLs   Current Functional Level Independent in ADLs/IADLs   Can patient return to prior living arrangement Yes   Ability to make needs known: Good   Family able to assist with home care needs: Yes   Would you like for me to discuss the discharge plan with any other family members/significant others, and if so, who? Yes  (family as needed)   Financial Resources Medicare   Community Resources None   Social/Functional History   Lives With Family;Daughter   Type of Home House   Home Layout One level   Home Access Ramped entrance   Home Equipment Cane;Rollator   Receives Help From Family   Prior Level of Assist for ADLs Independent   Prior Level of Assist for Homemaking Independent

## 2025-04-08 NOTE — H&P
prophylaxis not needed on Coumadin.  8. Elevated BNP with history of atrial fib  and small pleural effusion will obtain Echo.        MD MADYSON GALINDO/LACI  D:  04/07/2025 18:45:53  T:  04/07/2025 20:01:35  JOB #:  [id number]

## 2025-04-09 ENCOUNTER — APPOINTMENT (OUTPATIENT)
Facility: HOSPITAL | Age: 80
DRG: 194 | End: 2025-04-09
Attending: HOSPITALIST
Payer: MEDICARE

## 2025-04-09 LAB
ANION GAP SERPL CALC-SCNC: 6 MMOL/L (ref 2–12)
BASOPHILS # BLD: 0.02 K/UL (ref 0–0.1)
BASOPHILS NFR BLD: 0.2 % (ref 0–1)
BUN SERPL-MCNC: 17 MG/DL (ref 6–20)
BUN/CREAT SERPL: 18 (ref 12–20)
CALCIUM SERPL-MCNC: 9.1 MG/DL (ref 8.5–10.1)
CHLORIDE SERPL-SCNC: 105 MMOL/L (ref 97–108)
CO2 SERPL-SCNC: 26 MMOL/L (ref 21–32)
CREAT SERPL-MCNC: 0.93 MG/DL (ref 0.55–1.02)
DIFFERENTIAL METHOD BLD: ABNORMAL
ECHO AO ARCH DIAM: 2.2 CM
ECHO AO ASC DIAM: 3.3 CM
ECHO AO ASCENDING AORTA INDEX: 1.76 CM/M2
ECHO AV AREA PEAK VELOCITY: 2 CM2
ECHO AV AREA VTI: 2 CM2
ECHO AV AREA/BSA PEAK VELOCITY: 1.1 CM2/M2
ECHO AV AREA/BSA VTI: 1.1 CM2/M2
ECHO AV MEAN GRADIENT: 3 MMHG
ECHO AV MEAN VELOCITY: 0.7 M/S
ECHO AV PEAK GRADIENT: 5 MMHG
ECHO AV PEAK VELOCITY: 1.1 M/S
ECHO AV VELOCITY RATIO: 0.64
ECHO AV VTI: 21 CM
ECHO BSA: 1.92 M2
ECHO EST RA PRESSURE: 3 MMHG
ECHO LA DIAMETER INDEX: 1.7 CM/M2
ECHO LA DIAMETER: 3.2 CM
ECHO LA VOL A-L A4C: 44 ML (ref 22–52)
ECHO LA VOL MOD A4C: 41 ML (ref 22–52)
ECHO LA VOLUME INDEX A-L A4C: 23 ML/M2 (ref 16–34)
ECHO LA VOLUME INDEX MOD A4C: 22 ML/M2 (ref 16–34)
ECHO LV E' LATERAL VELOCITY: 3.43 CM/S
ECHO LV E' SEPTAL VELOCITY: 7.5 CM/S
ECHO LV EF PHYSICIAN: 55 %
ECHO LV FRACTIONAL SHORTENING: 21 % (ref 28–44)
ECHO LV INTERNAL DIMENSION DIASTOLE INDEX: 2.23 CM/M2
ECHO LV INTERNAL DIMENSION DIASTOLIC: 4.2 CM (ref 3.9–5.3)
ECHO LV INTERNAL DIMENSION SYSTOLIC INDEX: 1.76 CM/M2
ECHO LV INTERNAL DIMENSION SYSTOLIC: 3.3 CM
ECHO LV IVSD: 0.8 CM (ref 0.6–0.9)
ECHO LV MASS 2D: 101.3 G (ref 67–162)
ECHO LV MASS INDEX 2D: 53.9 G/M2 (ref 43–95)
ECHO LV POSTERIOR WALL DIASTOLIC: 0.8 CM (ref 0.6–0.9)
ECHO LV RELATIVE WALL THICKNESS RATIO: 0.38
ECHO LVOT AREA: 3.1 CM2
ECHO LVOT AV VTI INDEX: 0.62
ECHO LVOT DIAM: 2 CM
ECHO LVOT MEAN GRADIENT: 1 MMHG
ECHO LVOT PEAK GRADIENT: 2 MMHG
ECHO LVOT PEAK VELOCITY: 0.7 M/S
ECHO LVOT STROKE VOLUME INDEX: 21.7 ML/M2
ECHO LVOT SV: 40.8 ML
ECHO LVOT VTI: 13 CM
ECHO MV A VELOCITY: 0.28 M/S
ECHO MV AREA VTI: 2.2 CM2
ECHO MV E DECELERATION TIME (DT): 166.6 MS
ECHO MV E VELOCITY: 0.74 M/S
ECHO MV E/A RATIO: 2.64
ECHO MV E/E' LATERAL: 21.57
ECHO MV E/E' RATIO (AVERAGED): 15.72
ECHO MV E/E' SEPTAL: 9.87
ECHO MV LVOT VTI INDEX: 1.41
ECHO MV MAX VELOCITY: 1 M/S
ECHO MV MEAN GRADIENT: 2 MMHG
ECHO MV MEAN VELOCITY: 0.6 M/S
ECHO MV PEAK GRADIENT: 4 MMHG
ECHO MV VTI: 18.3 CM
ECHO PV MAX VELOCITY: 0.7 M/S
ECHO PV PEAK GRADIENT: 2 MMHG
ECHO RIGHT VENTRICULAR SYSTOLIC PRESSURE (RVSP): 28 MMHG
ECHO RV FREE WALL PEAK S': 17.8 CM/S
ECHO RVOT MEAN GRADIENT: 0 MMHG
ECHO RVOT PEAK GRADIENT: 1 MMHG
ECHO RVOT PEAK VELOCITY: 0.5 M/S
ECHO RVOT VTI: 7.6 CM
ECHO TV REGURGITANT MAX VELOCITY: 2.52 M/S
ECHO TV REGURGITANT PEAK GRADIENT: 26 MMHG
EOSINOPHIL # BLD: 0.26 K/UL (ref 0–0.4)
EOSINOPHIL NFR BLD: 2.2 % (ref 0–7)
ERYTHROCYTE [DISTWIDTH] IN BLOOD BY AUTOMATED COUNT: 14.8 % (ref 11.5–14.5)
GLUCOSE SERPL-MCNC: 115 MG/DL (ref 65–100)
HCT VFR BLD AUTO: 43.6 % (ref 35–47)
HGB BLD-MCNC: 14.2 G/DL (ref 11.5–16)
IMM GRANULOCYTES # BLD AUTO: 0.15 K/UL (ref 0–0.04)
IMM GRANULOCYTES NFR BLD AUTO: 1.2 % (ref 0–0.5)
INR PPP: 3.1 (ref 0.9–1.1)
LYMPHOCYTES # BLD: 1.28 K/UL (ref 0.8–3.5)
LYMPHOCYTES NFR BLD: 10.6 % (ref 12–49)
MCH RBC QN AUTO: 28.2 PG (ref 26–34)
MCHC RBC AUTO-ENTMCNC: 32.6 G/DL (ref 30–36.5)
MCV RBC AUTO: 86.5 FL (ref 80–99)
MONOCYTES # BLD: 1.26 K/UL (ref 0–1)
MONOCYTES NFR BLD: 10.5 % (ref 5–13)
NEUTS SEG # BLD: 9.05 K/UL (ref 1.8–8)
NEUTS SEG NFR BLD: 75.3 % (ref 32–75)
NRBC # BLD: 0 K/UL (ref 0–0.01)
NRBC BLD-RTO: 0 PER 100 WBC
PLATELET # BLD AUTO: 218 K/UL (ref 150–400)
PMV BLD AUTO: 10.8 FL (ref 8.9–12.9)
POTASSIUM SERPL-SCNC: 4.5 MMOL/L (ref 3.5–5.1)
PROTHROMBIN TIME: 29.8 SEC (ref 9.2–11.2)
RBC # BLD AUTO: 5.04 M/UL (ref 3.8–5.2)
SODIUM SERPL-SCNC: 137 MMOL/L (ref 136–145)
WBC # BLD AUTO: 12 K/UL (ref 3.6–11)

## 2025-04-09 PROCEDURE — 80048 BASIC METABOLIC PNL TOTAL CA: CPT

## 2025-04-09 PROCEDURE — 6360000002 HC RX W HCPCS: Performed by: INTERNAL MEDICINE

## 2025-04-09 PROCEDURE — 6370000000 HC RX 637 (ALT 250 FOR IP): Performed by: INTERNAL MEDICINE

## 2025-04-09 PROCEDURE — 94761 N-INVAS EAR/PLS OXIMETRY MLT: CPT

## 2025-04-09 PROCEDURE — 6360000004 HC RX CONTRAST MEDICATION: Performed by: INTERNAL MEDICINE

## 2025-04-09 PROCEDURE — 85610 PROTHROMBIN TIME: CPT

## 2025-04-09 PROCEDURE — 94640 AIRWAY INHALATION TREATMENT: CPT

## 2025-04-09 PROCEDURE — 2500000003 HC RX 250 WO HCPCS: Performed by: INTERNAL MEDICINE

## 2025-04-09 PROCEDURE — 2580000003 HC RX 258: Performed by: INTERNAL MEDICINE

## 2025-04-09 PROCEDURE — 6370000000 HC RX 637 (ALT 250 FOR IP): Performed by: HOSPITALIST

## 2025-04-09 PROCEDURE — 1100000000 HC RM PRIVATE

## 2025-04-09 PROCEDURE — 85025 COMPLETE CBC W/AUTO DIFF WBC: CPT

## 2025-04-09 PROCEDURE — C8929 TTE W OR WO FOL WCON,DOPPLER: HCPCS

## 2025-04-09 RX ORDER — WARFARIN SODIUM 1 MG/1
2 TABLET ORAL
Status: COMPLETED | OUTPATIENT
Start: 2025-04-09 | End: 2025-04-09

## 2025-04-09 RX ADMIN — WATER 1000 MG: 1 INJECTION INTRAMUSCULAR; INTRAVENOUS; SUBCUTANEOUS at 17:12

## 2025-04-09 RX ADMIN — GUAIFENESIN 600 MG: 600 TABLET ORAL at 08:47

## 2025-04-09 RX ADMIN — ACETAMINOPHEN 650 MG: 325 TABLET ORAL at 01:00

## 2025-04-09 RX ADMIN — CETIRIZINE HYDROCHLORIDE 10 MG: 10 TABLET, FILM COATED ORAL at 08:47

## 2025-04-09 RX ADMIN — LISINOPRIL 5 MG: 5 TABLET ORAL at 08:47

## 2025-04-09 RX ADMIN — AZITHROMYCIN MONOHYDRATE 500 MG: 500 INJECTION, POWDER, LYOPHILIZED, FOR SOLUTION INTRAVENOUS at 17:18

## 2025-04-09 RX ADMIN — WARFARIN SODIUM 2 MG: 1 TABLET ORAL at 17:12

## 2025-04-09 RX ADMIN — IPRATROPIUM BROMIDE AND ALBUTEROL SULFATE 1 DOSE: .5; 3 SOLUTION RESPIRATORY (INHALATION) at 18:21

## 2025-04-09 RX ADMIN — CARVEDILOL 6.25 MG: 6.25 TABLET, FILM COATED ORAL at 21:00

## 2025-04-09 RX ADMIN — IPRATROPIUM BROMIDE AND ALBUTEROL SULFATE 1 DOSE: .5; 3 SOLUTION RESPIRATORY (INHALATION) at 12:21

## 2025-04-09 RX ADMIN — IPRATROPIUM BROMIDE AND ALBUTEROL SULFATE 1 DOSE: .5; 3 SOLUTION RESPIRATORY (INHALATION) at 07:58

## 2025-04-09 RX ADMIN — SULFUR HEXAFLUORIDE 2 ML: KIT at 09:57

## 2025-04-09 RX ADMIN — GUAIFENESIN 600 MG: 600 TABLET ORAL at 21:00

## 2025-04-09 RX ADMIN — CARVEDILOL 6.25 MG: 6.25 TABLET, FILM COATED ORAL at 08:47

## 2025-04-09 NOTE — CARE COORDINATION
Care Management Progress Note    Reason for Admission:   Shortness of breath [R06.02]  Mediastinal lymphadenopathy [R59.0]  Pneumonia of left lower lobe due to infectious organism [J18.9]  Pneumonia due to infectious organism [J18.9]         Patient Admission Status: Inpatient  RUR: 13% Low  Hospitalization in the last 30 days (Readmission):  No        Transition of care plan:  Medical management ongoing.  Patient recommended for home health PT/OT. Daughter agreeable to [facility].   Discharge plan communicated with patient and/or discharge caregiver: NA    Date 1st IMM letter given: 4/8/25  Outpatient follow-up: PCP, Specialist  Transport at discharge: Family    Marina Finn, MS  [phone]

## 2025-04-10 VITALS
BODY MASS INDEX: 29.66 KG/M2 | RESPIRATION RATE: 18 BRPM | SYSTOLIC BLOOD PRESSURE: 141 MMHG | HEART RATE: 93 BPM | TEMPERATURE: 97.9 F | HEIGHT: 65 IN | WEIGHT: 178 LBS | DIASTOLIC BLOOD PRESSURE: 90 MMHG | OXYGEN SATURATION: 95 %

## 2025-04-10 LAB
BACTERIA SPEC CULT: NORMAL
BASOPHILS # BLD: 0.02 K/UL (ref 0–0.1)
BASOPHILS NFR BLD: 0.2 % (ref 0–1)
DIFFERENTIAL METHOD BLD: ABNORMAL
EOSINOPHIL # BLD: 0.22 K/UL (ref 0–0.4)
EOSINOPHIL NFR BLD: 2.4 % (ref 0–7)
ERYTHROCYTE [DISTWIDTH] IN BLOOD BY AUTOMATED COUNT: 14.6 % (ref 11.5–14.5)
GRAM STN SPEC: NORMAL
HCT VFR BLD AUTO: 42.7 % (ref 35–47)
HGB BLD-MCNC: 13.7 G/DL (ref 11.5–16)
IMM GRANULOCYTES # BLD AUTO: 0.08 K/UL (ref 0–0.04)
IMM GRANULOCYTES NFR BLD AUTO: 0.9 % (ref 0–0.5)
INR PPP: 2.2 (ref 0.9–1.1)
LYMPHOCYTES # BLD: 1.01 K/UL (ref 0.8–3.5)
LYMPHOCYTES NFR BLD: 11.1 % (ref 12–49)
MCH RBC QN AUTO: 27.9 PG (ref 26–34)
MCHC RBC AUTO-ENTMCNC: 32.1 G/DL (ref 30–36.5)
MCV RBC AUTO: 87 FL (ref 80–99)
MONOCYTES # BLD: 0.53 K/UL (ref 0–1)
MONOCYTES NFR BLD: 5.8 % (ref 5–13)
NEUTS SEG # BLD: 7.28 K/UL (ref 1.8–8)
NEUTS SEG NFR BLD: 79.6 % (ref 32–75)
NRBC # BLD: 0 K/UL (ref 0–0.01)
NRBC BLD-RTO: 0 PER 100 WBC
PLATELET # BLD AUTO: 245 K/UL (ref 150–400)
PMV BLD AUTO: 10.7 FL (ref 8.9–12.9)
PROTHROMBIN TIME: 22.5 SEC (ref 9.2–11.2)
RBC # BLD AUTO: 4.91 M/UL (ref 3.8–5.2)
SERVICE CMNT-IMP: NORMAL
WBC # BLD AUTO: 9.1 K/UL (ref 3.6–11)

## 2025-04-10 PROCEDURE — 6370000000 HC RX 637 (ALT 250 FOR IP): Performed by: INTERNAL MEDICINE

## 2025-04-10 PROCEDURE — 94761 N-INVAS EAR/PLS OXIMETRY MLT: CPT

## 2025-04-10 PROCEDURE — 97530 THERAPEUTIC ACTIVITIES: CPT

## 2025-04-10 PROCEDURE — 36415 COLL VENOUS BLD VENIPUNCTURE: CPT

## 2025-04-10 PROCEDURE — 97116 GAIT TRAINING THERAPY: CPT

## 2025-04-10 PROCEDURE — 85025 COMPLETE CBC W/AUTO DIFF WBC: CPT

## 2025-04-10 PROCEDURE — 94640 AIRWAY INHALATION TREATMENT: CPT

## 2025-04-10 PROCEDURE — 6370000000 HC RX 637 (ALT 250 FOR IP): Performed by: HOSPITALIST

## 2025-04-10 PROCEDURE — 97535 SELF CARE MNGMENT TRAINING: CPT

## 2025-04-10 PROCEDURE — 85610 PROTHROMBIN TIME: CPT

## 2025-04-10 RX ORDER — WARFARIN SODIUM 1 MG/1
2 TABLET ORAL
Status: DISCONTINUED | OUTPATIENT
Start: 2025-04-10 | End: 2025-04-10 | Stop reason: HOSPADM

## 2025-04-10 RX ORDER — IPRATROPIUM BROMIDE AND ALBUTEROL SULFATE 2.5; .5 MG/3ML; MG/3ML
3 SOLUTION RESPIRATORY (INHALATION)
Qty: 1080 DEVICE | Refills: 0 | Status: SHIPPED | OUTPATIENT
Start: 2025-04-10

## 2025-04-10 RX ORDER — AZITHROMYCIN 250 MG/1
500 TABLET, FILM COATED ORAL DAILY
Status: DISCONTINUED | OUTPATIENT
Start: 2025-04-10 | End: 2025-04-10 | Stop reason: HOSPADM

## 2025-04-10 RX ORDER — AZITHROMYCIN 500 MG/1
500 TABLET, FILM COATED ORAL DAILY
Qty: 4 TABLET | Refills: 0 | Status: SHIPPED | OUTPATIENT
Start: 2025-04-11 | End: 2025-04-15

## 2025-04-10 RX ORDER — CEFDINIR 300 MG/1
300 CAPSULE ORAL 2 TIMES DAILY
Qty: 8 CAPSULE | Refills: 0 | Status: SHIPPED | OUTPATIENT
Start: 2025-04-10 | End: 2025-04-14

## 2025-04-10 RX ORDER — NICOTINE 21 MG/24HR
1 PATCH, TRANSDERMAL 24 HOURS TRANSDERMAL DAILY
Qty: 90 DEVICE | Refills: 3 | Status: SHIPPED | OUTPATIENT
Start: 2025-04-11

## 2025-04-10 RX ORDER — GUAIFENESIN 600 MG/1
600 TABLET, EXTENDED RELEASE ORAL 2 TIMES DAILY
Qty: 20 TABLET | Refills: 0 | Status: SHIPPED | OUTPATIENT
Start: 2025-04-10

## 2025-04-10 RX ORDER — CEFDINIR 300 MG/1
300 CAPSULE ORAL 2 TIMES DAILY
Qty: 14 CAPSULE | Refills: 0 | Status: SHIPPED | OUTPATIENT
Start: 2025-04-10 | End: 2025-04-17

## 2025-04-10 RX ORDER — IPRATROPIUM BROMIDE AND ALBUTEROL SULFATE 2.5; .5 MG/3ML; MG/3ML
1 SOLUTION RESPIRATORY (INHALATION)
Status: DISCONTINUED | OUTPATIENT
Start: 2025-04-10 | End: 2025-04-10 | Stop reason: HOSPADM

## 2025-04-10 RX ORDER — LISINOPRIL 5 MG/1
5 TABLET ORAL DAILY
Qty: 30 TABLET | Refills: 3 | Status: SHIPPED | OUTPATIENT
Start: 2025-04-11

## 2025-04-10 RX ADMIN — LISINOPRIL 5 MG: 5 TABLET ORAL at 09:03

## 2025-04-10 RX ADMIN — CARVEDILOL 6.25 MG: 6.25 TABLET, FILM COATED ORAL at 09:03

## 2025-04-10 RX ADMIN — GUAIFENESIN 600 MG: 600 TABLET ORAL at 09:03

## 2025-04-10 RX ADMIN — AZITHROMYCIN DIHYDRATE 500 MG: 250 TABLET ORAL at 13:58

## 2025-04-10 RX ADMIN — IPRATROPIUM BROMIDE AND ALBUTEROL SULFATE 1 DOSE: .5; 3 SOLUTION RESPIRATORY (INHALATION) at 13:17

## 2025-04-10 RX ADMIN — CETIRIZINE HYDROCHLORIDE 10 MG: 10 TABLET, FILM COATED ORAL at 09:03

## 2025-04-10 NOTE — PROGRESS NOTES
Hospitalist Progress Note      NAME: Olimpia Hernandez   :  1945  MRM:  [id number]    Date/Time: 2025  6:42 PM    The patient is a very pleasant 79-year-old female who has previous history significant for hypertension, chronic atrial fibrillation, and hyperlipidemia, presented to ER due to cough and shortness of breath.  The patient's daughter says she has chronic cough, but recently they went to [place] for spring break.  When they came back, her cough  became worse.  She is bringing up dark green phlegm.  She is not clear if she had any fever.  She was also feeling weak.  The patient says she has been using inhaler and nebulizer at home as well.  When she coughs, her chest hurt.  In the ER, the patient had a CT scan done which was suggestive of bilateral patchy consolidative and ground-glass opacities, more significant in the left lung, which was suggestive of pneumonia.  She also had mediastinal and bilateral hilar lymphadenopathy.  The patient denies having any other complaints.          Subjective:     Chief Complaint:  Pt. Seen this am, daughter at bedside. Feels better, on room air. Cough improved. No chest pain/SOB, afebrile     ROS:  (bold if positive, if negative)  Cough, CAMPOS  Tolerating PT  Tolerating Diet          Objective:       Vitals:          Last 24hrs VS reviewed since prior progress note. Most recent are:    Vitals:    25 1554   BP: 128/74   Pulse: 81   Resp: 18   Temp: 98.6 °F (37 °C)   SpO2: 95%     SpO2 Readings from Last 6 Encounters:   25 95%   10/30/24 98%   10/10/24 100%   24 95%          Intake/Output Summary (Last 24 hours) at 2025 1842  Last data filed at 2025 1825  Gross per 24 hour   Intake --   Output 500 ml   Net -500 ml          Exam:     Physical Exam:    GENERAL:  The patient is an 79-year-old female, not in any acute distress.  HEENT:  Pupils equally reactive to light and accommodation.  NECK:  Supple.  There is no lymphadenopathy or 
    Name: Olimpia Hernandez Hospital: [facility]   : 1945 Admit Date: 2025   Phone: [phone]  Room: 72/   PCP: Rosana Kerr MD  MRN: [id number]   Date: 2025  Code: Full Code          Chart and notes reviewed. Data reviewed. I review the patient's current medications in the medical record at each encounter.  I have evaluated and examined the patient.       History of Present Illness:  Ms. Hernandez is an 80 yo woman with a history of recurrent bronchitis without formal diagnosis of COPD, tobacco use, HTN, afib, HLD, and CKD who is admitted with pneumonia. She suspects she has COPD as she gets bronchitis about once every 1-2 years, but has never had a formal diagnosis. She has a chronic cough, but felt acutely ill while traveling to [place] for spring break with her family. She describes cough productive of green sputum, shortness of breath, wheezing, pleuritic chest pain, and general malaise/fatigue. No known fevers. She has albuterol inhalers/nebs at home and used these without significant benefit. In the ED she had imaging concerning for PNA as well as LAD. She is feeling much better already.    Tobacco: has been smoking for >60 years    Labs: Cr 0.82, proBNP 3713, Hgb 11.2, , sputum culture in labs, rapid flu/COVID negative    Images reviewed:  CTA chest 2025: subcarinal and bilateral LAD, +coronary artery calcifications, LLL pleural thickening and small L effusion, patch ggo and consolidative opacities in the LLL and lower lobes with nodular aspects (LATISHA 2.7 x 2.3cm) RUL 0.7cm nodular opacity    Interval history  Afebrile  BP stable  Sats 93% on RA  400ml UOP documented so far this morning  WBC 12.0  INR 3.1 - trending down  RVP neg  Resp cx in process - prelim NRF thus far    ROS: Denies SOB.  Endorses dry cough.  Wants to get OOB more.    Past Medical History:   Diagnosis Date    Chronic anticoagulation     Chronic atrial fibrillation (HCC)     Chronic pain     CKD 
[facility] Pharmacy Dosing Services: 4/10/25   Consult for Warfarin Dosing by Pharmacy by Dr. Bella  Consult provided for this 79 y.o. F , for indication of afib   Day of Therapy: resumed from home (PTA regimen 2mg daily)  Admission INR on 4/7=4.2      Dose to achieve an INR goal of 2-3    H/H/Plts stable on 4/9. INR trending down (but w/n range) as expected. Will dose 2mg this evening to continue home regimen.     Date   INR    Dose  4/7     4.2      none  4/8     3.8      none  4/9     3.1      2  4/10   2.2          Significant drug interactions, such as enoxaparin: azithromycin (d3 - completed), elevated BNP   PT/INR Lab Results   Component Value Date/Time    INR 2.2 04/10/2025 05:37 AM      Platelets Lab Results   Component Value Date/Time     04/09/2025 03:55 AM      H/H Lab Results   Component Value Date/Time    HGB 14.2 04/09/2025 03:55 AM        Pharmacy to follow daily and will provide subsequent Warfarin dosing based on clinical status.  [facility]) Contact information [phone]        
[facility] Pharmacy Dosing Services: 04/08/25    Consult for Warfarin Dosing by Pharmacy by Dr. Bella  Consult provided for this 79 y.o. female , for indication of Atrial Fibrillation  Day of Therapy: Resumed from PTA medication list  Home Regimen: Warfarin 2 mg PO daily  Dose to achieve an INR goal of 2-3    Assessment/Plan:  H/H and platelets are stable, no documentation of any acute bleeding events  Today's INR of 3.8 is above therapeutic target goal  Will hold warfarin dose tonight due to high INR. High INR likely attributed to acute illness. Also note: Azithromycin started on 4/7, which may increase INR further.  INR ordered daily with AM labs to follow    Significant drug interactions, such as enoxaparin: Azithromycin  PT/INR Lab Results   Component Value Date/Time    INR 3.8 04/08/2025 02:53 AM      Platelets Lab Results   Component Value Date/Time     04/08/2025 02:53 AM      H/H Lab Results   Component Value Date/Time    HGB 13.1 04/08/2025 02:53 AM        Pharmacy to follow daily and will provide subsequent Warfarin dosing based on clinical status.  [facility]) Contact information [phone]  
Pt transferred from [facility].  Assessed and oriented to unit.  Pt able to walk wheelchair to bed. VSS on RA; tele box connected; 4 eyed skin check completed. Pt on temp droplet plus precautions awaiting results of respiratory panel results.  All safety measures in place, call bell within reach, will continue plan of care  
[facility] Pharmacy Dosing Services: 4/9/25   Consult for Warfarin Dosing by Pharmacy by Dr. Bella  Consult provided for this 79 y.o. F , for indication of afib   Day of Therapy: resumed from home (PTA regimen 2mg daily)  Admission INR on 4/7=4.2  Warfarin held 4/7 and 4/8    Dose to achieve an INR goal of 2-3    Order entered for  Warfarin  2 (mg) ordered to be given today at 18:00.     INR remains supratherapeutic today at 3.1 but has been decreasing since holding therapy. Will restart therapy today as expect INR to continue to drop. Counts stable.     Significant drug interactions, such as enoxaparin: azithromycin (d3), elevated BNP   PT/INR Lab Results   Component Value Date/Time    INR 3.1 04/09/2025 03:55 AM      Platelets Lab Results   Component Value Date/Time     04/09/2025 03:55 AM      H/H Lab Results   Component Value Date/Time    HGB 14.2 04/09/2025 03:55 AM        Pharmacy to follow daily and will provide subsequent Warfarin dosing based on clinical status.  [facility]) Contact information [phone]      
[facility] Pharmacy Dosing Services: 04/07/25    Consult for Warfarin Dosing by Pharmacy by Dr. Bella  Consult provided for this 79 y.o. female , for indication of Atrial Fibrillation  Day of Therapy: Resumed from PTA medication list  Home Regimen: Warfarin 2 mg PO daily  Dose to achieve an INR goal of 2-3    Assessment/Plan:  I spoke to one of the patient's family members and confirmed most current warfarin dosing is Jantoven 2 mg PO nightly. Patient has not had a warfarin dose today.  H/H and platelets are stable, no documentation of any acute bleeding events  Today's INR of 4.2 is above therapeutic target goal  Will hold warfarin dose tonight due to high INR. High INR likely attributed to acute illness. Also note: Azithromycin started today, which may increase INR further.  INR ordered daily with AM labs to follow    Significant drug interactions, such as enoxaparin: Azithromycin  PT/INR Lab Results   Component Value Date/Time    INR 4.2 04/07/2025 01:55 PM      Platelets Lab Results   Component Value Date/Time     04/07/2025 01:55 PM      H/H Lab Results   Component Value Date/Time    HGB 14.2 04/07/2025 01:55 PM        Pharmacy to follow daily and will provide subsequent Warfarin dosing based on clinical status.  [facility]) Contact information [phone]  
questions.      JORGE L Peterson  
shifted left  ST now depressed in Lateral leads  Confirmed by Yusuf Bella MD. ([id number]) on 4/7/2025 9:37:06 PM     CBC with Auto Differential    Collection Time: 04/07/25  1:55 PM   Result Value Ref Range    WBC 10.5 3.6 - 11.0 K/uL    RBC 5.03 3.80 - 5.20 M/uL    Hemoglobin 14.2 11.5 - 16.0 g/dL    Hematocrit 43.3 35.0 - 47.0 %    MCV 86.1 80.0 - 99.0 FL    MCH 28.2 26.0 - 34.0 PG    MCHC 32.8 30.0 - 36.5 g/dL    RDW 14.8 (H) 11.5 - 14.5 %    Platelets 189 150 - 400 K/uL    MPV 10.8 8.9 - 12.9 FL    Nucleated RBCs 0.0 0  WBC    nRBC 0.00 0.00 - 0.01 K/uL    Neutrophils % 76.1 (H) 32.0 - 75.0 %    Lymphocytes % 9.8 (L) 12.0 - 49.0 %    Monocytes % 12.5 5.0 - 13.0 %    Eosinophils % 0.6 0.0 - 7.0 %    Basophils % 0.3 0.0 - 1.0 %    Immature Granulocytes % 0.7 (H) 0.0 - 0.5 %    Neutrophils Absolute 7.99 1.80 - 8.00 K/UL    Lymphocytes Absolute 1.03 0.80 - 3.50 K/UL    Monocytes Absolute 1.31 (H) 0.00 - 1.00 K/UL    Eosinophils Absolute 0.06 0.00 - 0.40 K/UL    Basophils Absolute 0.03 0.00 - 0.10 K/UL    Immature Granulocytes Absolute 0.07 (H) 0.00 - 0.04 K/UL    Differential Type AUTOMATED     Comprehensive Metabolic Panel    Collection Time: 04/07/25  1:55 PM   Result Value Ref Range    Sodium 137 136 - 145 mmol/L    Potassium 3.8 3.5 - 5.1 mmol/L    Chloride 107 97 - 108 mmol/L    CO2 24 21 - 32 mmol/L    Anion Gap 6 2 - 12 mmol/L    Glucose 132 (H) 65 - 100 mg/dL    BUN 21 (H) 6 - 20 MG/DL    Creatinine 0.96 0.55 - 1.02 MG/DL    BUN/Creatinine Ratio 22 (H) 12 - 20      Est, Glom Filt Rate 60 (L) >60 ml/min/1.73m2    Calcium 9.4 8.5 - 10.1 MG/DL    Total Bilirubin 1.8 (H) 0.2 - 1.0 MG/DL    ALT 48 12 - 78 U/L    AST 39 (H) 15 - 37 U/L    Alk Phosphatase 122 (H) 45 - 117 U/L    Total Protein 7.1 6.4 - 8.2 g/dL    Albumin 2.8 (L) 3.5 - 5.0 g/dL    Globulin 4.3 (H) 2.0 - 4.0 g/dL    Albumin/Globulin Ratio 0.7 (L) 1.1 - 2.2     Magnesium    Collection Time: 04/07/25  1:55 PM   Result Value Ref Range    Magnesium

## 2025-04-10 NOTE — DISCHARGE SUMMARY
[id number]  [phone]    Follow up in 1 week(s)    Approximate time spent in patient care on day of discharge: 55 minutes     Signed:  Maya Armstrong MD  4/10/2025  3:03 PM

## 2025-04-10 NOTE — DISCHARGE INSTRUCTIONS
nodes.    Leoncio Smith MD  [address]  [facility] [address] [phone]    Follow up in 1 week(s)        For questions regarding your Hospitalization or to contact the Hospital Medicine team, please call [phone].      Information obtained by :  I understand that if any problems occur once I am at home I am to contact my physician.    I understand and acknowledge receipt of the instructions indicated above.                                                                                                                                           Physician's or R.N.'s Signature                                                                  Date/Time                                                                                                                                              Patient or Representative Signature                                                          Date/Time

## 2025-04-10 NOTE — PLAN OF CARE
Problem: Discharge Planning  Goal: Discharge to home or other facility with appropriate resources  4/10/2025 0208 by Olimpia Contreras RN  Outcome: Progressing  4/10/2025 0208 by Olimpia Contreras RN  Outcome: Progressing  Flowsheets (Taken 4/9/2025 1924)  Discharge to home or other facility with appropriate resources:   Identify barriers to discharge with patient and caregiver   Arrange for needed discharge resources and transportation as appropriate   Identify discharge learning needs (meds, wound care, etc)   Arrange for interpreters to assist at discharge as needed   Refer to discharge planning if patient needs post-hospital services based on physician order or complex needs related to functional status, cognitive ability or social support system     Problem: Safety - Adult  Goal: Free from fall injury  4/10/2025 0208 by Olimpia Contreras RN  Outcome: Progressing  4/10/2025 0208 by Olimpia Contreras RN  Outcome: Progressing     Problem: ABCDS Injury Assessment  Goal: Absence of physical injury  4/10/2025 0208 by Olimpia Contreras RN  Outcome: Progressing  4/10/2025 0208 by Olimpia Contreras RN  Outcome: Progressing     Problem: Respiratory - Adult  Goal: Achieves optimal ventilation and oxygenation  Outcome: Progressing  Flowsheets (Taken 4/9/2025 1924)  Achieves optimal ventilation and oxygenation: Assess for changes in respiratory status     Problem: Skin/Tissue Integrity  Goal: Skin integrity remains intact  Description: 1.  Monitor for areas of redness and/or skin breakdown  2.  Assess vascular access sites hourly  3.  Every 4-6 hours minimum:  Change oxygen saturation probe site  4.  Every 4-6 hours:  If on nasal continuous positive airway pressure, respiratory therapy assess nares and determine need for appliance change or resting period  4/10/2025 0208 by Olimpia Contreras RN  Outcome: Progressing  4/10/2025 0208 by Olimpia Contreras RN  Outcome: Progressing  Flowsheets (Taken 4/9/2025 1924)  Skin Integrity Remains Intact: Monitor for 
  Problem: Occupational Therapy - Adult  Goal: By Discharge: Performs self-care activities at highest level of function for planned discharge setting.  See evaluation for individualized goals.  Description: FUNCTIONAL STATUS PRIOR TO ADMISSION:    Receives Help From: Family, Prior Level of Assist for ADLs: Independent,  ,  ,  ,  ,  , Prior Level of Assist for Homemaking: Independent, Ambulation Assistance: Independent, Prior Level of Assist for Transfers: Independent, Active : No     HOME SUPPORT: Patient lived with daughter and was Mod I for mobility with rollator, not on home O2, and required up to Min A from dtr for LB ADLs.    Occupational Therapy Goals:  Initiated 4/8/2025  1.  Patient will perform grooming in standing with Stand by Assist and Set-up within 7 day(s).  2.  Patient will perform upper body dressing with Stand by Assist and Set-up within 7 day(s).  3.  Patient will perform lower body dressing with Minimal Assist within 7 day(s).  4.  Patient will perform toilet transfers with Stand by Assist  within 7 day(s).  5.  Patient will perform all aspects of toileting with Stand by Assist within 7 day(s).  6.  Patient will participate in upper extremity therapeutic exercise/activities with Stand by Assist for 10 minutes within 7 day(s).    7.  Patient will utilize energy conservation techniques during functional activities with verbal and visual cues within 7 day(s).    Outcome: Progressing   OCCUPATIONAL THERAPY TREATMENT  Patient: Olimpia Hernandez (79 y.o. female)  Date: 4/10/2025  Primary Diagnosis: Shortness of breath [R06.02]  Mediastinal lymphadenopathy [R59.0]  Pneumonia of left lower lobe due to infectious organism [J18.9]  Pneumonia due to infectious organism [J18.9]       Precautions:                  Chart, occupational therapy assessment, plan of care, and goals were reviewed.    ASSESSMENT  Patient continues to benefit from skilled OT services and is progressing towards goals. Patient with 
  Problem: Physical Therapy - Adult  Goal: By Discharge: Performs mobility at highest level of function for planned discharge setting.  See evaluation for individualized goals.  Description: FUNCTIONAL STATUS PRIOR TO ADMISSION: Patient was modified independent using a rollator for functional mobility.  Patient recently went to [place] with her daughter and family for Spring Break.    HOME SUPPORT PRIOR TO ADMISSION: The patient lived with her daughter and daughter's family but did not require assistance.    Physical Therapy Goals  Initiated 4/8/2025  1.  Patient will move from supine to sit and sit to supine, scoot up and down, and roll side to side in bed with modified independence within 7 day(s).    2.  Patient will perform sit to stand with modified independence within 7 day(s).  3.  Patient will transfer from bed to chair and chair to bed with modified independence using the least restrictive device within 7 day(s).  4.  Patient will ambulate with modified independence for 150 feet with the least restrictive device within 7 day(s).   5.  Patient will sit up in chair 2 hours at a time to improve OOB tolerance within 7 day(s).        Outcome: Progressing   PHYSICAL THERAPY EVALUATION    Patient: Olimpia Hernandez (79 y.o. female)  Date: 4/8/2025  Primary Diagnosis: Shortness of breath [R06.02]  Mediastinal lymphadenopathy [R59.0]  Pneumonia of left lower lobe due to infectious organism [J18.9]  Pneumonia due to infectious organism [J18.9]       Precautions:              ASSESSMENT :   DEFICITS/IMPAIRMENTS:   The patient is limited by decreased functional mobility, strength, activity tolerance, endurance, safety awareness, balance, increased pain levels, and gait dysfunction s/p admission for SOB and PNA.  Patient recently in [place] for family's spring break, typically mod indep using a rollator. Today received on RA with no c/o.  Overall slow but moving well in bed to EOB.  Required up to MIN A to come to standing from 
  Problem: Physical Therapy - Adult  Goal: By Discharge: Performs mobility at highest level of function for planned discharge setting.  See evaluation for individualized goals.  Description: FUNCTIONAL STATUS PRIOR TO ADMISSION: Patient was modified independent using a rollator for functional mobility.  Patient recently went to [place] with her daughter and family for Spring Break.    HOME SUPPORT PRIOR TO ADMISSION: The patient lived with her daughter and daughter's family but did not require assistance.    Physical Therapy Goals  Initiated 4/8/2025  1.  Patient will move from supine to sit and sit to supine, scoot up and down, and roll side to side in bed with modified independence within 7 day(s).    2.  Patient will perform sit to stand with modified independence within 7 day(s).  3.  Patient will transfer from bed to chair and chair to bed with modified independence using the least restrictive device within 7 day(s).  4.  Patient will ambulate with modified independence for 150 feet with the least restrictive device within 7 day(s).   5.  Patient will sit up in chair 2 hours at a time to improve OOB tolerance within 7 day(s).        Outcome: Progressing   PHYSICAL THERAPY TREATMENT    Patient: Olimpia Hernandez (79 y.o. female)  Date: 4/10/2025  Diagnosis: Shortness of breath [R06.02]  Mediastinal lymphadenopathy [R59.0]  Pneumonia of left lower lobe due to infectious organism [J18.9]  Pneumonia due to infectious organism [J18.9] Pneumonia due to infectious organism      Precautions:        contact, fall      ASSESSMENT:  Patient continues to benefit from skilled PT services and is progressing towards goals. Pt was seen this morning following RN clearance and OT session. Pt presented in her recliner, NAD on RA. Pt was pleasantly agreeable to POC\" transfer training, gait training, and then return to bed (per her request 2/2 fatigue). Pt was also introduced to some BLE therex for strengthening during her acute stay. 
  Problem: Safety - Adult  Goal: Free from fall injury  4/8/2025 2352 by Marina Zaragoza, RN  Outcome: Progressing  4/8/2025 1153 by Chandrika Hurley, RN  Outcome: Progressing     
or extensive additional review of patient history:   Social/Functional History  Lives With: Family, Daughter  Type of Home: House  Home Layout: One level  Home Access: Ramped entrance  Bathroom Shower/Tub: Walk-in shower  Bathroom Toilet: Handicap height  Bathroom Equipment: Grab bars in shower  Home Equipment: Cane, Rollator, Grab bars  Has the patient had two or more falls in the past year or any fall with injury in the past year?: No  Receives Help From: Family  Prior Level of Assist for ADLs: Independent  Prior Level of Assist for Homemaking: Independent  Prior Level of Assist for Transfers: Independent  Active : No      Hand Dominance: right     EXAMINATION OF PERFORMANCE DEFICITS:    Cognitive/Behavioral Status:  Orientation  Overall Orientation Status: Within Functional Limits  Orientation Level: Oriented X4  Cognition  Overall Cognitive Status: WFL    Skin: WNL    Edema: increased in BLEs    Hearing:   Hearing  Hearing: Within functional limits    Vision/Perceptual:             Perception  Overall Perceptual Status: WFL  Vision  Vision: Within Functional Limits         Range of Motion:   AROM: Generally decreased, functional  PROM: Within functional limits      Strength:  Strength: Generally decreased, functional      Coordination:  Coordination: Generally decreased, functional            Tone & Sensation:   Tone: Normal  Sensation: Impaired (peripheral neuropathy BLE proximal to knee)          Functional Mobility and Transfers for ADLs:    Bed Mobility:     Bed Mobility Training  Bed Mobility Training: Yes  Supine to Sit: Contact guard assistance  Scooting: Contact guard assistance    Transfers:      Transfer Training  Transfer Training: Yes  Sit to Stand: Minimal assistance  Stand to Sit: Minimal assistance  Bed to Chair: Minimal assistance  Toilet Transfer: Minimal assistance                     Balance:      Balance  Sitting: Impaired  Sitting - Static: Good (unsupported)  Sitting - Dynamic: Fair

## 2025-07-07 ENCOUNTER — TRANSCRIBE ORDERS (OUTPATIENT)
Facility: HOSPITAL | Age: 80
End: 2025-07-07

## 2025-07-07 DIAGNOSIS — R93.89 ABNORMAL CHEST CT: Primary | ICD-10-CM
